# Patient Record
Sex: MALE | Race: WHITE | NOT HISPANIC OR LATINO | Employment: UNEMPLOYED | URBAN - METROPOLITAN AREA
[De-identification: names, ages, dates, MRNs, and addresses within clinical notes are randomized per-mention and may not be internally consistent; named-entity substitution may affect disease eponyms.]

---

## 2019-09-30 ENCOUNTER — HISTORICAL (OUTPATIENT)
Dept: ADMINISTRATIVE | Facility: HOSPITAL | Age: 49
End: 2019-09-30

## 2019-09-30 LAB
ALBUMIN SERPL-MCNC: 3.8 GM/DL (ref 3.4–5)
ALBUMIN/GLOB SERPL: 1.4 {RATIO}
ALP SERPL-CCNC: 62 UNIT/L (ref 50–136)
ALT SERPL-CCNC: 21 UNIT/L (ref 12–78)
AST SERPL-CCNC: 9 UNIT/L (ref 15–37)
BILIRUB SERPL-MCNC: 0.3 MG/DL (ref 0.2–1)
BILIRUBIN DIRECT+TOT PNL SERPL-MCNC: 0.1 MG/DL (ref 0–0.2)
BILIRUBIN DIRECT+TOT PNL SERPL-MCNC: 0.2 MG/DL (ref 0–0.8)
BUN SERPL-MCNC: 23 MG/DL (ref 7–18)
CALCIUM SERPL-MCNC: 9.3 MG/DL (ref 8.5–10.1)
CHLORIDE SERPL-SCNC: 107 MMOL/L (ref 98–107)
CHOLEST SERPL-MCNC: 275 MG/DL (ref 0–200)
CHOLEST/HDLC SERPL: 8.1 {RATIO} (ref 0–5)
CO2 SERPL-SCNC: 28 MMOL/L (ref 21–32)
CREAT SERPL-MCNC: 0.92 MG/DL (ref 0.7–1.3)
GLOBULIN SER-MCNC: 2.8 GM/DL (ref 2.4–3.5)
GLUCOSE SERPL-MCNC: 104 MG/DL (ref 74–106)
HAV IGM SERPL QL IA: NEGATIVE
HBV CORE IGM SERPL QL IA: NEGATIVE
HBV SURFACE AG SERPL QL IA: NEGATIVE
HCV AB SERPL QL IA: NEGATIVE
HDLC SERPL-MCNC: 34 MG/DL (ref 35–60)
HEPATITIS PANEL INTERP: NORMAL
HIV 1+2 AB+HIV1 P24 AG SERPL QL IA: NEGATIVE
LDLC SERPL CALC-MCNC: 203 MG/DL (ref 0–129)
POTASSIUM SERPL-SCNC: 3.9 MMOL/L (ref 3.5–5.1)
PROT SERPL-MCNC: 6.6 GM/DL (ref 6.4–8.2)
SODIUM SERPL-SCNC: 139 MMOL/L (ref 136–145)
TRIGL SERPL-MCNC: 191 MG/DL (ref 30–150)
VLDLC SERPL CALC-MCNC: 38 MG/DL

## 2021-12-14 ENCOUNTER — HISTORICAL (OUTPATIENT)
Dept: ADMINISTRATIVE | Facility: HOSPITAL | Age: 51
End: 2021-12-14

## 2022-02-25 ENCOUNTER — HISTORICAL (OUTPATIENT)
Dept: ADMINISTRATIVE | Facility: HOSPITAL | Age: 52
End: 2022-02-25

## 2022-04-12 ENCOUNTER — HISTORICAL (OUTPATIENT)
Dept: ADMINISTRATIVE | Facility: HOSPITAL | Age: 52
End: 2022-04-12

## 2022-04-30 VITALS
HEIGHT: 67 IN | BODY MASS INDEX: 24.53 KG/M2 | WEIGHT: 156.31 LBS | DIASTOLIC BLOOD PRESSURE: 80 MMHG | SYSTOLIC BLOOD PRESSURE: 130 MMHG | OXYGEN SATURATION: 98 %

## 2023-03-16 DIAGNOSIS — R16.0 HYPODENSE MASS OF RIGHT LOBE OF LIVER: Primary | ICD-10-CM

## 2023-05-11 ENCOUNTER — HOSPITAL ENCOUNTER (INPATIENT)
Facility: HOSPITAL | Age: 53
LOS: 7 days | Discharge: HOME | DRG: 885 | End: 2023-05-18
Attending: PSYCHIATRY & NEUROLOGY | Admitting: PSYCHIATRY & NEUROLOGY
Payer: MEDICAID

## 2023-05-11 DIAGNOSIS — F29 UNSPECIFIED PSYCHOSIS NOT DUE TO A SUBSTANCE OR KNOWN PHYSIOLOGICAL CONDITION: ICD-10-CM

## 2023-05-11 LAB
ALBUMIN SERPL-MCNC: 3.9 G/DL (ref 3.5–5)
ALBUMIN/GLOB SERPL: 1.3 RATIO (ref 1.1–2)
ALP SERPL-CCNC: 66 UNIT/L (ref 40–150)
ALT SERPL-CCNC: 17 UNIT/L (ref 0–55)
AST SERPL-CCNC: 16 UNIT/L (ref 5–34)
BASOPHILS # BLD AUTO: 0.05 X10(3)/MCL
BASOPHILS NFR BLD AUTO: 0.7 %
BILIRUBIN DIRECT+TOT PNL SERPL-MCNC: 0.3 MG/DL
BUN SERPL-MCNC: 10.1 MG/DL (ref 8.4–25.7)
CALCIUM SERPL-MCNC: 9.7 MG/DL (ref 8.4–10.2)
CHLORIDE SERPL-SCNC: 105 MMOL/L (ref 98–107)
CHOLEST SERPL-MCNC: 307 MG/DL
CHOLEST/HDLC SERPL: 7 {RATIO} (ref 0–5)
CO2 SERPL-SCNC: 27 MMOL/L (ref 22–29)
CREAT SERPL-MCNC: 0.85 MG/DL (ref 0.73–1.18)
EOSINOPHIL # BLD AUTO: 0.09 X10(3)/MCL (ref 0–0.9)
EOSINOPHIL NFR BLD AUTO: 1.3 %
ERYTHROCYTE [DISTWIDTH] IN BLOOD BY AUTOMATED COUNT: 15.7 % (ref 11.5–17)
EST. AVERAGE GLUCOSE BLD GHB EST-MCNC: 111.2 MG/DL
GFR SERPLBLD CREATININE-BSD FMLA CKD-EPI: >60 MLS/MIN/1.73/M2
GLOBULIN SER-MCNC: 3.1 GM/DL (ref 2.4–3.5)
GLUCOSE SERPL-MCNC: 86 MG/DL (ref 74–100)
HBA1C MFR BLD: 5.5 %
HCT VFR BLD AUTO: 50.2 % (ref 42–52)
HDLC SERPL-MCNC: 45 MG/DL (ref 35–60)
HGB BLD-MCNC: 16.6 G/DL (ref 14–18)
IMM GRANULOCYTES # BLD AUTO: 0.02 X10(3)/MCL (ref 0–0.04)
IMM GRANULOCYTES NFR BLD AUTO: 0.3 %
LDLC SERPL CALC-MCNC: 229 MG/DL (ref 50–140)
LYMPHOCYTES # BLD AUTO: 1.84 X10(3)/MCL (ref 0.6–4.6)
LYMPHOCYTES NFR BLD AUTO: 25.8 %
MCH RBC QN AUTO: 30.5 PG (ref 27–31)
MCHC RBC AUTO-ENTMCNC: 33.1 G/DL (ref 33–36)
MCV RBC AUTO: 92.1 FL (ref 80–94)
MONOCYTES # BLD AUTO: 1 X10(3)/MCL (ref 0.1–1.3)
MONOCYTES NFR BLD AUTO: 14 %
NEUTROPHILS # BLD AUTO: 4.14 X10(3)/MCL (ref 2.1–9.2)
NEUTROPHILS NFR BLD AUTO: 57.9 %
NRBC BLD AUTO-RTO: 0 %
PLATELET # BLD AUTO: 309 X10(3)/MCL (ref 130–400)
PMV BLD AUTO: 9.4 FL (ref 7.4–10.4)
POTASSIUM SERPL-SCNC: 5.1 MMOL/L (ref 3.5–5.1)
PROT SERPL-MCNC: 7 GM/DL (ref 6.4–8.3)
RBC # BLD AUTO: 5.45 X10(6)/MCL (ref 4.7–6.1)
SODIUM SERPL-SCNC: 142 MMOL/L (ref 136–145)
T PALLIDUM AB SER QL: NONREACTIVE
TRIGL SERPL-MCNC: 166 MG/DL (ref 34–140)
TSH SERPL-ACNC: 0.86 UIU/ML (ref 0.35–4.94)
VLDLC SERPL CALC-MCNC: 33 MG/DL
WBC # SPEC AUTO: 7.14 X10(3)/MCL (ref 4.5–11.5)

## 2023-05-11 PROCEDURE — 84443 ASSAY THYROID STIM HORMONE: CPT | Performed by: PSYCHIATRY & NEUROLOGY

## 2023-05-11 PROCEDURE — 80053 COMPREHEN METABOLIC PANEL: CPT | Performed by: PSYCHIATRY & NEUROLOGY

## 2023-05-11 PROCEDURE — 25000003 PHARM REV CODE 250

## 2023-05-11 PROCEDURE — 12400001 HC PSYCH SEMI-PRIVATE ROOM

## 2023-05-11 PROCEDURE — 80061 LIPID PANEL: CPT | Performed by: PSYCHIATRY & NEUROLOGY

## 2023-05-11 PROCEDURE — 86780 TREPONEMA PALLIDUM: CPT | Performed by: PSYCHIATRY & NEUROLOGY

## 2023-05-11 PROCEDURE — 85025 COMPLETE CBC W/AUTO DIFF WBC: CPT | Performed by: PSYCHIATRY & NEUROLOGY

## 2023-05-11 PROCEDURE — 83036 HEMOGLOBIN GLYCOSYLATED A1C: CPT | Performed by: PSYCHIATRY & NEUROLOGY

## 2023-05-11 RX ORDER — LORAZEPAM 1 MG/1
2 TABLET ORAL EVERY 4 HOURS PRN
Status: DISCONTINUED | OUTPATIENT
Start: 2023-05-11 | End: 2023-05-18 | Stop reason: HOSPADM

## 2023-05-11 RX ORDER — DIPHENHYDRAMINE HYDROCHLORIDE 50 MG/ML
50 INJECTION INTRAMUSCULAR; INTRAVENOUS EVERY 4 HOURS PRN
Status: DISCONTINUED | OUTPATIENT
Start: 2023-05-11 | End: 2023-05-18 | Stop reason: HOSPADM

## 2023-05-11 RX ORDER — QUETIAPINE FUMARATE 100 MG/1
100 TABLET, FILM COATED ORAL NIGHTLY
Status: DISCONTINUED | OUTPATIENT
Start: 2023-05-11 | End: 2023-05-15

## 2023-05-11 RX ORDER — ARIPIPRAZOLE 5 MG/1
30 TABLET ORAL DAILY
Status: DISCONTINUED | OUTPATIENT
Start: 2023-05-11 | End: 2023-05-18 | Stop reason: HOSPADM

## 2023-05-11 RX ORDER — MAG HYDROX/ALUMINUM HYD/SIMETH 200-200-20
30 SUSPENSION, ORAL (FINAL DOSE FORM) ORAL EVERY 6 HOURS PRN
Status: DISCONTINUED | OUTPATIENT
Start: 2023-05-11 | End: 2023-05-18 | Stop reason: HOSPADM

## 2023-05-11 RX ORDER — LORAZEPAM 2 MG/ML
2 INJECTION INTRAMUSCULAR EVERY 4 HOURS PRN
Status: DISCONTINUED | OUTPATIENT
Start: 2023-05-11 | End: 2023-05-18 | Stop reason: HOSPADM

## 2023-05-11 RX ORDER — HALOPERIDOL 5 MG/ML
10 INJECTION INTRAMUSCULAR EVERY 4 HOURS PRN
Status: DISCONTINUED | OUTPATIENT
Start: 2023-05-11 | End: 2023-05-18 | Stop reason: HOSPADM

## 2023-05-11 RX ORDER — ONDANSETRON 4 MG/1
4 TABLET, ORALLY DISINTEGRATING ORAL EVERY 8 HOURS PRN
Status: DISCONTINUED | OUTPATIENT
Start: 2023-05-11 | End: 2023-05-18 | Stop reason: HOSPADM

## 2023-05-11 RX ORDER — HYDROXYZINE HYDROCHLORIDE 50 MG/1
50 TABLET, FILM COATED ORAL EVERY 4 HOURS PRN
Status: DISCONTINUED | OUTPATIENT
Start: 2023-05-11 | End: 2023-05-18 | Stop reason: HOSPADM

## 2023-05-11 RX ORDER — IBUPROFEN 200 MG
1 TABLET ORAL DAILY
Status: DISCONTINUED | OUTPATIENT
Start: 2023-05-11 | End: 2023-05-18 | Stop reason: HOSPADM

## 2023-05-11 RX ORDER — PROMETHAZINE HYDROCHLORIDE 25 MG/1
25 TABLET ORAL EVERY 6 HOURS PRN
Status: DISCONTINUED | OUTPATIENT
Start: 2023-05-11 | End: 2023-05-18 | Stop reason: HOSPADM

## 2023-05-11 RX ORDER — ACETAMINOPHEN 325 MG/1
650 TABLET ORAL EVERY 6 HOURS PRN
Status: DISCONTINUED | OUTPATIENT
Start: 2023-05-11 | End: 2023-05-18 | Stop reason: HOSPADM

## 2023-05-11 RX ORDER — HALOPERIDOL 5 MG/1
10 TABLET ORAL EVERY 4 HOURS PRN
Status: DISCONTINUED | OUTPATIENT
Start: 2023-05-11 | End: 2023-05-18 | Stop reason: HOSPADM

## 2023-05-11 RX ORDER — DIPHENHYDRAMINE HCL 50 MG
50 CAPSULE ORAL EVERY 4 HOURS PRN
Status: DISCONTINUED | OUTPATIENT
Start: 2023-05-11 | End: 2023-05-18 | Stop reason: HOSPADM

## 2023-05-11 RX ADMIN — ARIPIPRAZOLE 30 MG: 5 TABLET ORAL at 10:05

## 2023-05-11 RX ADMIN — QUETIAPINE FUMARATE 100 MG: 100 TABLET ORAL at 08:05

## 2023-05-11 NOTE — H&P
[facility] - Behavioral Health Unit  History & Physical    Subjective:      Chief Complaint/Reason for Admission: history of schizophrenia in past with refusal to take his meds     Gerardo Lopez is a 52 y.o. male. Refuses to take his meds - delusional disorder     Past Medical History:   Diagnosis Date    Addiction to drug     Bipolar disorder     History of psychiatric hospitalization     Psychiatric exam requested by authority     Schizoaffective disorder     Substance abuse      No past surgical history on file.  History reviewed. No pertinent family history.  Social History     Tobacco Use    Smoking status: Every Day     Packs/day: 1.00     Types: Cigarettes     Passive exposure: Current    Smokeless tobacco: Never   Substance Use Topics    Alcohol use: Not Currently    Drug use: Yes     Types: Amphetamines       PTA Medications   Medication Sig    ARIPiprazole (ABILIFY) 30 MG Tab Take 30 mg by mouth.    QUEtiapine (SEROQUEL) 300 MG Tab Take 300 mg by mouth every evening.     Review of patient's allergies indicates:  No Known Allergies     Review of Systems   Constitutional: Negative.    HENT: Negative.     Eyes: Negative.    Respiratory: Negative.     Cardiovascular: Negative.    Gastrointestinal: Negative.    Genitourinary: Negative.    Musculoskeletal: Negative.    Skin: Negative.    Neurological: Negative.    Endo/Heme/Allergies: Negative.    Psychiatric/Behavioral:  Positive for depression and suicidal ideas. Negative for hallucinations and substance abuse.      Objective:      Vital Signs (Most Recent)  Temp: 97.9 °F (36.6 °C) (05/11/23 0031)  Pulse: 82 (05/11/23 0031)  Resp: 18 (05/11/23 0031)  BP: 92/63 (05/11/23 0031)  SpO2: 96 % (05/11/23 0031)    Vital Signs Range (Last 24H):  Temp:  [97.7 °F (36.5 °C)-98.2 °F (36.8 °C)]   Pulse:  []   Resp:  [18-20]   BP: ()/()   SpO2:  [96 %-100 %]     Physical Exam  HENT:      Head: Normocephalic.      Right Ear: Tympanic  membrane normal.      Left Ear: Tympanic membrane normal.      Nose: Nose normal.      Mouth/Throat:      Mouth: Mucous membranes are moist.   Eyes:      Extraocular Movements: Extraocular movements intact.      Pupils: Pupils are equal, round, and reactive to light.   Cardiovascular:      Rate and Rhythm: Normal rate and regular rhythm.   Pulmonary:      Effort: Pulmonary effort is normal.   Abdominal:      General: Abdomen is flat.   Musculoskeletal:         General: Normal range of motion.   Skin:     General: Skin is warm.   Neurological:      General: No focal deficit present.      Mental Status: He is alert and oriented to person, place, and time.      Comments: Vision normal   Hearing normal   EOM intact   Face muscles normal  Facial sensation normal   Shrugs shoulders  Tongue midline          Data Review:    Recent Results (from the past 48 hour(s))   Ethanol    Collection Time: 05/10/23  2:42 PM   Result Value Ref Range    Ethanol Level <10.0 <=10.0 mg/dL   Comprehensive Metabolic Panel    Collection Time: 05/10/23  2:42 PM   Result Value Ref Range    Sodium Level 140 136 - 145 mmol/L    Potassium Level 4.3 3.5 - 5.1 mmol/L    Chloride 103 98 - 107 mmol/L    Carbon Dioxide 26 22 - 29 mmol/L    Glucose Level 110 (H) 74 - 100 mg/dL    Blood Urea Nitrogen 9.5 8.4 - 25.7 mg/dL    Creatinine 0.87 0.73 - 1.18 mg/dL    Calcium Level Total 9.5 8.4 - 10.2 mg/dL    Protein Total 7.4 6.4 - 8.3 gm/dL    Albumin Level 4.0 3.5 - 5.0 g/dL    Globulin 3.4 2.4 - 3.5 gm/dL    Albumin/Globulin Ratio 1.2 1.1 - 2.0 ratio    Bilirubin Total 0.3 <=1.5 mg/dL    Alkaline Phosphatase 75 40 - 150 unit/L    Alanine Aminotransferase 17 0 - 55 unit/L    Aspartate Aminotransferase 16 5 - 34 unit/L    eGFR >60 mls/min/1.73/m2   Acetaminophen Level    Collection Time: 05/10/23  2:42 PM   Result Value Ref Range    Acetaminophen Level <17.4 (L) 17.4 - 30.0 ug/ml   Salicylate Level    Collection Time: 05/10/23  2:42 PM   Result Value Ref  Range    Salicylate Level <5.0 mg/dL   TSH    Collection Time: 05/10/23  2:42 PM   Result Value Ref Range    Thyroid Stimulating Hormone 1.348 0.350 - 4.940 uIU/mL   CBC with Differential    Collection Time: 05/10/23  2:42 PM   Result Value Ref Range    WBC 6.47 4.50 - 11.50 x10(3)/mcL    RBC 5.54 4.70 - 6.10 x10(6)/mcL    Hgb 17.0 14.0 - 18.0 g/dL    Hct 51.2 42.0 - 52.0 %    MCV 92.4 80.0 - 94.0 fL    MCH 30.7 27.0 - 31.0 pg    MCHC 33.2 33.0 - 36.0 g/dL    RDW 15.8 11.5 - 17.0 %    Platelet 304 130 - 400 x10(3)/mcL    MPV 8.4 7.4 - 10.4 fL    Neut % 51.1 %    Lymph % 30.4 %    Mono % 16.8 %    Eos % 0.8 %    Basophil % 0.6 %    Lymph # 1.97 0.6 - 4.6 x10(3)/mcL    Neut # 3.30 2.1 - 9.2 x10(3)/mcL    Mono # 1.09 0.1 - 1.3 x10(3)/mcL    Eos # 0.05 0 - 0.9 x10(3)/mcL    Baso # 0.04 <=0.2 x10(3)/mcL    IG# 0.02 0 - 0.04 x10(3)/mcL    IG% 0.3 %    NRBC% 0.0 %   Drug Screen, Urine    Collection Time: 05/10/23  5:35 PM   Result Value Ref Range    Amphetamines, Urine Positive (A) Negative    Barbituates, Urine Negative Negative    Benzodiazepine, Urine Negative Negative    Cannabinoids, Urine Positive (A) Negative    Cocaine, Urine Negative Negative    Fentanyl, Urine Negative Negative    MDMA, Urine Negative Negative    Opiates, Urine Negative Negative    Phencyclidine, Urine Negative Negative    pH, Urine 6.5 3.0 - 11.0   Urinalysis, Reflex to Urine Culture    Collection Time: 05/10/23  5:35 PM    Specimen: Urine, Clean Catch   Result Value Ref Range    Color, UA Yellow Yellow, Light-Yellow, Dark Yellow, Sima, Straw    Appearance, UA Clear Clear    Specific Gravity, UA 1.018 1.005 - 1.030    pH, UA 6.5 5.0 - 8.5    Protein, UA Trace (A) Negative mg/dL    Glucose, UA Negative Negative, Normal mg/dL    Ketones, UA Negative Negative mg/dL    Blood, UA Negative Negative unit/L    Bilirubin, UA Negative Negative mg/dL    Urobilinogen, UA 0.2 0.2, 1.0, Normal mg/dL    Nitrites, UA Negative Negative    Leukocyte Esterase, UA  Negative Negative unit/L   Urinalysis, Microscopic    Collection Time: 05/10/23  5:35 PM   Result Value Ref Range    RBC, UA 6 (H) <=5 /HPF    WBC, UA <5 <=5 /HPF    Squamous Epithelial Cells, UA <5 <=5 /HPF    Bacteria, UA None Seen None Seen, Rare, Occasional /HPF   COVID-19 Rapid Screening    Collection Time: 05/10/23  8:13 PM   Result Value Ref Range    SARS COV-2 MOLECULAR Negative Negative        No results found.       Assessment and Plan       Delusional disorder

## 2023-05-11 NOTE — PLAN OF CARE
Problem: Adult Inpatient Plan of Care  Goal: Plan of Care Review  Outcome: Ongoing, Progressing  Goal: Patient-Specific Goal (Individualized)  Outcome: Ongoing, Progressing  Goal: Absence of Hospital-Acquired Illness or Injury  Outcome: Ongoing, Progressing  Goal: Optimal Comfort and Wellbeing  Outcome: Ongoing, Progressing  Goal: Readiness for Transition of Care  Outcome: Ongoing, Progressing     Problem: Violence Risk or Actual  Goal: Anger and Impulse Control  Outcome: Ongoing, Progressing     Problem: Behavior Regulation Impairment (Excessive Substance Use)  Goal: Improved Behavioral Control (Excessive Substance Use)  Outcome: Ongoing, Progressing     Problem: Decreased Participation and Engagement (Excessive Substance Use)  Goal: Increased Participation and Engagement (Excessive Substance Use)  Outcome: Ongoing, Progressing     Problem: Decreased Participation and Engagement (Psychotic Signs/Symptoms)  Goal: Increased Participation and Engagement (Psychotic Signs/Symptoms)  Outcome: Ongoing, Progressing     Problem: Mood Impairment (Psychotic Signs/Symptoms)  Goal: Improved Mood Symptoms (Psychotic Signs/Symptoms)  Outcome: Ongoing, Progressing

## 2023-05-11 NOTE — NURSING
"Daily Nursing Note:      Behavior:    Patient (Gerardo Lopez is a 52 y.o. male, : 1970, MRN: [id number]) demonstrating an affect that was  labile. Gerardo demonstrating mood that is anxious. Gerardo had an appearance that was disheveled and poor hygiene. Gerardo denies suicidal ideation. Gerardo denies suicide plan. Gerardo denies homicidal ideation. Gerardo denies hallucinations.    Gerardo's  height is 5' 6" (1.676 m) and weight is 70.3 kg (155 lb). His temperature is 97.9 °F (36.6 °C). His blood pressure is 92/63 and his pulse is 82. His respiration is 18 and oxygen saturation is 96%.     Gerardo's last BM was noted on: 05/10/2023_______      Intervention:    Encourage Gerardo to perform self-hygiene, grooming, and changing of clothing. Monitor Gerardo's behavior and program compliance. Monitor Gerardo for suicidal ideation, homicidal ideation, sleep disturbance, and hallucinations. Encourage Gerardo to eat all portions of meals and assess for meal preferences. Monitor Gerardo for intake and output to ensure hydration. Notify the Physician/Physician Assistant/Advance Practice Registered Nurse (MD/PA/APRN) for any medication refusal and any change in patient condition.      Response:    Gerardo verbalizes understand of unit process and procedures. Gerardo reported medications __ _ Medications  started today.  Compliant with medications.  Educated on new medications.___.      Plan:     Continue to monitor per MD/PA/APRN orders; maintain patient safety.    "

## 2023-05-11 NOTE — PLAN OF CARE
Psychosocial Assessment     Pt is a 52 y.o. YO  male admitted due to psychosis. Pt UDS was Positive for THC and methamphetamines.  Pt ETOH < 10. Pt denies  SI, HI, and AVH at this time. Pt last inpatient  was 2009 but denied [facility] inpatient . Pt presents Cooperative with CM staff 1:1. Pt originally from [place]  . Pt has  no dependents. Pt  is Single .  Pt completed Some college. Pt no  service.  Pt denies financial issues. Pt disabled.  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 Pt denies legal issues. Pt states they do not receive comfort from spiritual practices. Pt emergency contact is Father Maribell Lopez. Their phone number is  [phone] . Pt discharge plan at this time is home; which is known at this time. Pt reports living with father and is unable to recall address.       05/11/23 1210   Initial Information   Source of Information patient   Reason for Admission psychosis   Patient Aware of Diagnosis yes   Arrived From emergency department   Current or Previous  Service none   Spiritual Beliefs   Spiritual, Cultural Beliefs, Holiness Practices, Values that Affect Care yes   Description of Beliefs that Will Affect Care Rastafari   Substance Use/Withdrawal   Substance Use Current, used prior to admission   Additional Tobacco Use   How many cigarettes do you typically have per day? 20   Abuse Screen (yes response referral indicated)   Feels Unsafe at Home or Work/School no   Feels Threatened by Someone no   Does anyone try to keep you from having contact with others or doing things outside your home? no   Physical Signs of Abuse Present no   Abuse Details   Physical Abuse No   Sexual Abuse No   Emotional Abuse No   AUDIT-C (Alcohol Use Disorders ID Test)   Alcohol Use In Past Year 0-->never   Alcohol Amount Per Day In Past Year 0-->none   More Than 6 Drinks On One Occasion In Past Year 0-->never   Total Audit C Score 0

## 2023-05-11 NOTE — GROUP NOTE
Group Psychotherapy       Group Focus: Life Skills   Leisure Exploration: Patient will be provided with several leisure activities to choose from. Patient will be able to attend the group session with an appropriate and positive outlook on life and the group session. Patient will be able to give and receive positive feedback to and from peers and therapist.    Number of patients in attendance: 5    Group Start Time: 1400  Group End Time:  1430  Groups Date: 5/11/2023  Group Topic:  Behavioral Health  [facility]  Group Facilitators:  Monae Wang  _____________________________________________________________________    Patient Name: Gerardo Lopez  MRN: [id number]  Patient Class: IP- Psych   Admission Date\Time: 5/11/2023 12:31 AM  Hospital Length of Stay: 0  Primary Care Provider: Primary Doctor No     Referred by:      Target symptoms: Psychosis     Patient's response to treatment: Not Participating; Pt did not attend group session. Therapist will provide alternate activity.     Progress toward goals: Not progressing     Interval History:      Diagnosis:      Plan: Therapist will continue to encourage patient to attend daily group sessions. Patient will engage adequately and effectively in group setting.

## 2023-05-11 NOTE — CARE UPDATE
SW spoke to nursing staff at [facility] who relayed pt is not an active pt as of 5.3.2023. Pt was last seen on 1.4.2022 and last in contact with any member of staff on 5.4.2022.

## 2023-05-11 NOTE — PROGRESS NOTES
05/11/23 0149   Pain/Comfort/Sleep   Preferred Pain Scale number (Numeric Rating Pain Scale)   Comfort/Acceptable Pain Level 0   Pain Rating (0-10): Rest 0   Coping/Psychosocial   Verbalized Emotional State anger;frustration   Behavioral   General Appearance [WDL Definition: Well-kept, clean; dress appropriate for weather/appropriate for setting] WDL except   General Appearance unkempt;unclean;body odor   Behavior WDL   Behavior [WDL Definition: Appropriate to situation, cooperative, appropriate eye contact; erect posture, head raised, steady gait; no unusual gestures/mannerisms] WDL except   Behavior Interactions argumentative;demanding;eye contact intense;guarded;hostile;mistrustful;suspicious   Motor Movement agitated;eye contact inappropriate   Emotion Mood WDL   Emotion/Mood/Affect [WDL Definition: Calm; euthymic; affect consistent with mood; facial expression relaxed, appropriate to situation] WDL except   Affect animated;labile   Emotion/Mood/Affect Symptoms angry;anxious;elevated;irritable   Speech WDL   Speech [WDL Definition: Moderate rate and volume; clear, coherent; articulate; effective] WDL except   Speech Symptoms hyper verbal;pressured speech   Perceptual State WDL   Perceptual State [WDL Definition: Consistent with reality; denies hallucinations] WDL except   Hallucinations denies hallucinations   Perceptual State derealization   Thought Process WDL   Thought Process [WDL Definition: Judgment and insight appropriate to situation; logical, relevant, and linear thought process] WDL except   Delusions grandeur;paranoid;persecutory;somatic   Judgment and Insight blaming others;insight not appropriate to situation   Thought Content denial;preoccupation;suspiciousness   Thought Process Symptoms disorganized;flight of ideas;illogical;tangential   Intellectual Performance WDL   Intellectual Performance [WDL Definition: Alert, oriented x 4; immediate, recent and remote memory intact; able to comprehend]  WDL except   Intellectual Performance Symptoms impaired concentration   Level of Consciousness (AVPU) alert   AIMS   Face/Oral Movement: Face Muscle Express 0-->none   Face/Oral Movement: Lips/Perioral 0-->none   Face/Oral Movement: Jaw 0-->none   Face/Oral Movement: Tongue 0-->none   Extremity Movement: Upper 0-->none   Extremity Movement: Lower 0-->none   Trunk Movement: Neck/Shoulder/Hips 0-->none   Global Judgment: Severity 0-->none, normal   Global Judgment: Incapacitated 0-->none, normal   Global Judgment: Awareness 0-->no awareness   Dental: Current Problems no   Dental: Dentures Worn no   Additional Info: [place] no   Additional Info: Disappear with Sleep no   Cognitive   Cognitive/Neuro/Behavioral WDL ex   Orientation oriented x 4   Speech clear/fluent   Safety   Patient Location conference room   Observed Behavior angry/hostile;sitting   [place] Psychiatric Fall Risk Tool   Age 10-->50 - 79   Mental Status -4-->Fully alert/oriented at all times   Elimination 8-->Independent with control of bowel/bladder   Medications 10-->No medications   Diagnosis 10-->Bipolar/schizoaffective disorder   Ambulation/Balance 7-->Independent/steady gait/immobile   Nutrition 0-->No apparent abnormalities with appetite   Sleep Disturbance 8-->No sleep disturbance   History of Falls 8-->No history of falls   Score ([place] Fall Risk) 57   ABC Risk for Fall with Injury Assessment   A= Age: Is the patient greater than or equal to 85 years old or frail due to clinical condition? No   B=Bones: Does the patient have osteoporosis, previous fracture, prolonged steroid use, or metastatic bone cancer? No   C=Coagulation Disorders: Does the patient have a bleeding disorder, either through anticoagulants or underlying clinical condition? No   S=recent Surgery: Is the patient post-op surgicalwith a recent lower limb amputation or recent major abdominal or thoracic surgery? No   Babb Suicide Severity Rating Scale   1. Wish to be Dead:  Have you wished you were dead or wished you could go to sleep and not wake up? No   2. Suicidal Thoughts: Have you actually had any thoughts of killing yourself? No   6. Suicide Behavior Question: Have you ever done anything, started to do anything, or prepared to do anything to end your life? No   Suicide Risk No Risk   Violence Risk Screening-10   Previous and/or current violence No   Previous and/or current threats (verbal/physical) No   Previous and/or current substance abuse Yes   Previous and/or current major mental illness Yes   Personality disorder No   Shows lack of insight into illness and/or behavior Yes   Expresses suspicion Yes   Shows lack of empathy Yes   Unrealistic planning Yes   Future stress-situations Yes   Violence Risk   Feels Like Hurting Others no   Safety Management    Patient Rounds visualized patient   Safety Promotion/Fall Prevention nonskid shoes/socks when out of bed   Daily Care   Activity (Behavioral Health) up ad eric   Positioning   Body Position position changed independently   Nutrition   Diet/Nutrition Received regular   Gastrointestinal   GI WDL WDL   Genitourinary   Genitourinary WDL WDL   Skin   Skin WDL WDL   Juan José Risk Assessment   Sensory Perception 4-->no impairment   Moisture 4-->rarely moist   Activity 4-->walks frequently   Mobility 4-->no limitation   Nutrition 2-->probably inadequate   Friction and Shear 3-->no apparent problem   Juan José Score 21   RN Clinical Review   I have evaluated the data collected on this patient and nursing care provided. Done

## 2023-05-11 NOTE — PLAN OF CARE
Problem: Adult Inpatient Plan of Care  Goal: Plan of Care Review  Outcome: Ongoing, Progressing  Goal: Patient-Specific Goal (Individualized)  Outcome: Ongoing, Progressing  Goal: Absence of Hospital-Acquired Illness or Injury  Outcome: Ongoing, Progressing  Goal: Optimal Comfort and Wellbeing  Outcome: Ongoing, Progressing  Goal: Readiness for Transition of Care  Outcome: Ongoing, Progressing     Problem: Violence Risk or Actual  Goal: Anger and Impulse Control  Outcome: Ongoing, Progressing     Problem: Behavior Regulation Impairment (Excessive Substance Use)  Goal: Improved Behavioral Control (Excessive Substance Use)  Outcome: Ongoing, Progressing     Problem: Decreased Participation and Engagement (Excessive Substance Use)  Goal: Increased Participation and Engagement (Excessive Substance Use)  Outcome: Ongoing, Progressing     Problem: Decreased Participation and Engagement (Psychotic Signs/Symptoms)  Goal: Increased Participation and Engagement (Psychotic Signs/Symptoms)  Outcome: Ongoing, Progressing     Problem: Mood Impairment (Psychotic Signs/Symptoms)  Goal: Improved Mood Symptoms (Psychotic Signs/Symptoms)  Outcome: Ongoing, Progressing     Patient admitted to unit.  Plan of care discussed and patient participation encouraged.

## 2023-05-11 NOTE — H&P
5/11/2023  Gerardo Lopez   1970   [id number]            Psychiatry Inpatient Admission Note    Date of Admission: 5/11/2023 12:31 AM    Current Legal Status: Physician's Emergency Certificate    Chief Complaint: I've been cleared of schizophrenia    SUBJECTIVE:   History of Present Illness:   Gerardo Lopez is a 52 y.o. male placed under a PEC at [facility] by OPC from mother after apparently being non compliant with medications and having an exacerbation of symptoms. Patient states that he has bee told by his provider at [facility] that he can stop taking his medications and start using medical marijuana for his symptoms because he does not have Bipolar disorder or Schizophrenia. Patient states that he has been cleared of schizophrenia by the government. He is focused on getting off of his medications because he does not believe he needs them. I discussed with him further about his illness and he is noncompliant with this. I discussed with him about possibly changing his medications and he is not amenable to this.  He would like us to contact Dr Orellana to discuss this. We will need collateral information from both provider and mother. I will decrease the Seroquel to 100 mg at this time and continue Abilify at 30 mg.         Past Psychiatric History:   Previous Psychiatric Hospitalizations: States twice   Previous Medication Trials: Multiple. Most recent Abilify and Seroquel  Previous Suicide Attempts: Denies   Outpatient psychiatrist: Dr Burton with [facility]    Past Medical/Surgical History:   Past Medical History:   Diagnosis Date    Addiction to drug     Bipolar disorder     History of psychiatric hospitalization     Psychiatric exam requested by authority     Schizoaffective disorder     Substance abuse      No past surgical history on file.      Family Psychiatric History:   Mother - Schizophrenia and Bipolar     Allergies:   Review of patient's allergies indicates:  No Known  Allergies    Substance Abuse History:   Tobacco: 1 PPD  Alcohol: Denies  Illicit Substances: Methamphetamine, THC  Treatment: Denies      Current Medications:   Home Psychiatric Meds: Abilify 30 mg, Seroquel 300 mg    Scheduled Meds:    ARIPiprazole  30 mg Oral Daily    nicotine  1 patch Transdermal Daily    QUEtiapine  100 mg Oral QHS      PRN Meds: acetaminophen, aluminum-magnesium hydroxide-simethicone, haloperidoL **AND** diphenhydrAMINE **AND** LORazepam **AND** haloperidol lactate **AND** diphenhydrAMINE **AND** lorazepam, hydrOXYzine HCL, ondansetron, promethazine   Psychotherapeutics (From admission, onward)      Start     Stop Route Frequency Ordered    05/11/23 2100  QUEtiapine tablet 100 mg         -- Oral Nightly 05/11/23 0935    05/11/23 1045  ARIPiprazole tablet 30 mg         -- Oral Daily 05/11/23 0935    05/11/23 0100  haloperidoL tablet 10 mg  (Med - Acute  Behavioral Management)        See Hyperspace for full Linked Orders Report.    -- Oral Every 4 hours PRN 05/11/23 0100    05/11/23 0100  LORazepam tablet 2 mg  (Med - Acute  Behavioral Management)        See Hyperspace for full Linked Orders Report.    -- Oral Every 4 hours PRN 05/11/23 0100    05/11/23 0100  haloperidol lactate injection 10 mg  (Med - Acute  Behavioral Management)        See Hyperspace for full Linked Orders Report.    -- IM Every 4 hours PRN 05/11/23 0100    05/11/23 0100  LORazepam injection 2 mg  (Med - Acute  Behavioral Management)        See Hyperspace for full Linked Orders Report.    -- IM Every 4 hours PRN 05/11/23 0100              Social History:  Housing Status: Lives with father  Relationship Status/Sexual Orientation: Single   Children: Denies  Education: College degree   Employment Status/Info: Disabled    history: Denies  History of physical/sexual abuse: Denies   Access to gun: Denies       Legal History:   Past Charges/Incarcerations: Denies   Pending charges: Denies      OBJECTIVE:   Medical Review Of  Systems:  A comprehensive review of systems was negative except for: Gastrointestinal: positive for vomiting    Vitals   Vitals:    05/11/23 0031   BP: 92/63   Pulse: 82   Resp: 18   Temp: 97.9 °F (36.6 °C)        Labs/Imaging/Studies:   Recent Results (from the past 48 hour(s))   Ethanol    Collection Time: 05/10/23  2:42 PM   Result Value Ref Range    Ethanol Level <10.0 <=10.0 mg/dL   Comprehensive Metabolic Panel    Collection Time: 05/10/23  2:42 PM   Result Value Ref Range    Sodium Level 140 136 - 145 mmol/L    Potassium Level 4.3 3.5 - 5.1 mmol/L    Chloride 103 98 - 107 mmol/L    Carbon Dioxide 26 22 - 29 mmol/L    Glucose Level 110 (H) 74 - 100 mg/dL    Blood Urea Nitrogen 9.5 8.4 - 25.7 mg/dL    Creatinine 0.87 0.73 - 1.18 mg/dL    Calcium Level Total 9.5 8.4 - 10.2 mg/dL    Protein Total 7.4 6.4 - 8.3 gm/dL    Albumin Level 4.0 3.5 - 5.0 g/dL    Globulin 3.4 2.4 - 3.5 gm/dL    Albumin/Globulin Ratio 1.2 1.1 - 2.0 ratio    Bilirubin Total 0.3 <=1.5 mg/dL    Alkaline Phosphatase 75 40 - 150 unit/L    Alanine Aminotransferase 17 0 - 55 unit/L    Aspartate Aminotransferase 16 5 - 34 unit/L    eGFR >60 mls/min/1.73/m2   Acetaminophen Level    Collection Time: 05/10/23  2:42 PM   Result Value Ref Range    Acetaminophen Level <17.4 (L) 17.4 - 30.0 ug/ml   Salicylate Level    Collection Time: 05/10/23  2:42 PM   Result Value Ref Range    Salicylate Level <5.0 mg/dL   TSH    Collection Time: 05/10/23  2:42 PM   Result Value Ref Range    Thyroid Stimulating Hormone 1.348 0.350 - 4.940 uIU/mL   CBC with Differential    Collection Time: 05/10/23  2:42 PM   Result Value Ref Range    WBC 6.47 4.50 - 11.50 x10(3)/mcL    RBC 5.54 4.70 - 6.10 x10(6)/mcL    Hgb 17.0 14.0 - 18.0 g/dL    Hct 51.2 42.0 - 52.0 %    MCV 92.4 80.0 - 94.0 fL    MCH 30.7 27.0 - 31.0 pg    MCHC 33.2 33.0 - 36.0 g/dL    RDW 15.8 11.5 - 17.0 %    Platelet 304 130 - 400 x10(3)/mcL    MPV 8.4 7.4 - 10.4 fL    Neut % 51.1 %    Lymph % 30.4 %    Mono %  16.8 %    Eos % 0.8 %    Basophil % 0.6 %    Lymph # 1.97 0.6 - 4.6 x10(3)/mcL    Neut # 3.30 2.1 - 9.2 x10(3)/mcL    Mono # 1.09 0.1 - 1.3 x10(3)/mcL    Eos # 0.05 0 - 0.9 x10(3)/mcL    Baso # 0.04 <=0.2 x10(3)/mcL    IG# 0.02 0 - 0.04 x10(3)/mcL    IG% 0.3 %    NRBC% 0.0 %   Drug Screen, Urine    Collection Time: 05/10/23  5:35 PM   Result Value Ref Range    Amphetamines, Urine Positive (A) Negative    Barbituates, Urine Negative Negative    Benzodiazepine, Urine Negative Negative    Cannabinoids, Urine Positive (A) Negative    Cocaine, Urine Negative Negative    Fentanyl, Urine Negative Negative    MDMA, Urine Negative Negative    Opiates, Urine Negative Negative    Phencyclidine, Urine Negative Negative    pH, Urine 6.5 3.0 - 11.0   Urinalysis, Reflex to Urine Culture    Collection Time: 05/10/23  5:35 PM    Specimen: Urine, Clean Catch   Result Value Ref Range    Color, UA Yellow Yellow, Light-Yellow, Dark Yellow, Sima, Straw    Appearance, UA Clear Clear    Specific Gravity, UA 1.018 1.005 - 1.030    pH, UA 6.5 5.0 - 8.5    Protein, UA Trace (A) Negative mg/dL    Glucose, UA Negative Negative, Normal mg/dL    Ketones, UA Negative Negative mg/dL    Blood, UA Negative Negative unit/L    Bilirubin, UA Negative Negative mg/dL    Urobilinogen, UA 0.2 0.2, 1.0, Normal mg/dL    Nitrites, UA Negative Negative    Leukocyte Esterase, UA Negative Negative unit/L   Urinalysis, Microscopic    Collection Time: 05/10/23  5:35 PM   Result Value Ref Range    RBC, UA 6 (H) <=5 /HPF    WBC, UA <5 <=5 /HPF    Squamous Epithelial Cells, UA <5 <=5 /HPF    Bacteria, UA None Seen None Seen, Rare, Occasional /HPF   COVID-19 Rapid Screening    Collection Time: 05/10/23  8:13 PM   Result Value Ref Range    SARS COV-2 MOLECULAR Negative Negative   Hemoglobin A1C    Collection Time: 05/11/23  7:28 AM   Result Value Ref Range    Hemoglobin A1c 5.5 <=7.0 %    Estimated Average Glucose 111.2 mg/dL   CBC with Differential    Collection Time:  05/11/23  7:28 AM   Result Value Ref Range    WBC 7.14 4.50 - 11.50 x10(3)/mcL    RBC 5.45 4.70 - 6.10 x10(6)/mcL    Hgb 16.6 14.0 - 18.0 g/dL    Hct 50.2 42.0 - 52.0 %    MCV 92.1 80.0 - 94.0 fL    MCH 30.5 27.0 - 31.0 pg    MCHC 33.1 33.0 - 36.0 g/dL    RDW 15.7 11.5 - 17.0 %    Platelet 309 130 - 400 x10(3)/mcL    MPV 9.4 7.4 - 10.4 fL    Neut % 57.9 %    Lymph % 25.8 %    Mono % 14.0 %    Eos % 1.3 %    Basophil % 0.7 %    Lymph # 1.84 0.6 - 4.6 x10(3)/mcL    Neut # 4.14 2.1 - 9.2 x10(3)/mcL    Mono # 1.00 0.1 - 1.3 x10(3)/mcL    Eos # 0.09 0 - 0.9 x10(3)/mcL    Baso # 0.05 <=0.2 x10(3)/mcL    IG# 0.02 0 - 0.04 x10(3)/mcL    IG% 0.3 %    NRBC% 0.0 %   Lipid panel    Collection Time: 05/11/23  7:28 AM   Result Value Ref Range    Cholesterol Total 307 (H) <=200 mg/dL    HDL Cholesterol 45 35 - 60 mg/dL    Triglyceride 166 (H) 34 - 140 mg/dL    Cholesterol/HDL Ratio 7 (H) 0 - 5    Very Low Density Lipoprotein 33     LDL Cholesterol 229.00 (H) 50.00 - 140.00 mg/dL   Comprehensive metabolic panel    Collection Time: 05/11/23  7:28 AM   Result Value Ref Range    Sodium Level 142 136 - 145 mmol/L    Potassium Level 5.1 3.5 - 5.1 mmol/L    Chloride 105 98 - 107 mmol/L    Carbon Dioxide 27 22 - 29 mmol/L    Glucose Level 86 74 - 100 mg/dL    Blood Urea Nitrogen 10.1 8.4 - 25.7 mg/dL    Creatinine 0.85 0.73 - 1.18 mg/dL    Calcium Level Total 9.7 8.4 - 10.2 mg/dL    Protein Total 7.0 6.4 - 8.3 gm/dL    Albumin Level 3.9 3.5 - 5.0 g/dL    Globulin 3.1 2.4 - 3.5 gm/dL    Albumin/Globulin Ratio 1.3 1.1 - 2.0 ratio    Bilirubin Total 0.3 <=1.5 mg/dL    Alkaline Phosphatase 66 40 - 150 unit/L    Alanine Aminotransferase 17 0 - 55 unit/L    Aspartate Aminotransferase 16 5 - 34 unit/L    eGFR >60 mls/min/1.73/m2      No results found for: PHENYTOIN, PHENOBARB, VALPROATE, CBMZ        Psychiatric Mental Status Exam:  General Appearance: well-nourished, normal weight, dressed in hospital garb, appears older than stated age, poorly  groomed, disheveled  Arousal: alert  Behavior: cooperative, appropriate eye-contact, restless and fidgety, Defiant but somewhat redirectable  Movements and Motor Activity: no abnormal involuntary movements noted; no tics, no tremors, no akathisia, no dystonia, no evidence of tardive dyskinesia; no psychomotor agitation or retardation  Orientation: intact; oriented fully to person, place, time and situation  Speech: normal volume, conversational, spontaneous, interruptible, pressured, refuses to speak on topics that go against his beliefs or feelings  Mood: Manic, Irritable, and Dysphoric  Affect: dysphoric, irritable, expansive, bizarre  Thought Process: goal-directed, organized,but illogical  Associations: intact, no loosening of associations  Thought Content and Perceptions: no suicidal or homicidal ideation, no auditory or visual hallucinations, probable paranoid ideation, no ideas of reference, (+) evidence of delusions or psychosis  Recent and Remote Memory: intact; per interview/observation with patient  Attention and Concentration: intact; per interview/observation with patient  Fund of Knowledge: intact; based on history, vocabulary, fund of knowledge, syntax, grammar, and content  Insight: impaired due to illness ; based on understanding of severity of illness and HPI  Judgment: impaired due to illness ; based on patient's behavior and HPI      Patient Strengths:  Access to care and Able to verbalize needs      Patient Liabilities:  Medication non-compliance, Substance use, Detrimental home environment, Psychosis, Svetlana, Chronic psychiatric illness, and Family stressors      Discharge Criteria:  Improved mood, Improved thought process, Medication compliance, Decreased anxiety, Improved social functioning, and Motivation for outpatient counseling      Reason for Admission:  The patient poses a significant and immediate danger to self., The patient poses a significant and immediate danger to others due to a  psychiatric condition., The patient is gravely disabled due to a psychiatric condition., The psychiatric disorder requires intensive treatment that necessitates 24 hour observation and care., The patient presents with psychiatric symptoms of sufficient severity to bring about significant or profound impairment of day to day psychological, social, vocational, and/or educational functioning., To stabilize the decompensation of a mental disorder that severely interferes with patient's functioning., and The patient has failed to make sufficient clinical gains within a traditional outpatient setting and severity of presenting symptoms requires inpatient treatment.    ASSESSMENT/PLAN:   Diagnoses:  SUBSTANCE-RELATED DISORDERS; Amphetamine Related Disorders; Amphetamine Abuse  and SCHIZOPHRENIA AND OTHER PSYCHOTIC DISORDERS; Schizoaffective Disorder  Bipolar type (F25.0)        Past Medical History:   Diagnosis Date    Addiction to drug     Bipolar disorder     History of psychiatric hospitalization     Psychiatric exam requested by [facility]     Schizoaffective disorder     Substance abuse           Problem lists and Management Plans:  -Admit to [facility]    Schizoaffective disorder  -Abilify 30 mg  -Decrease Seroquel to 100 mg    Amphetamine abuse  -Individual and group therapy    -Will attempt to obtain outside psychiatric records if available  - to assist with aftercare planning and collateral  -Continue inpatient treatment as evidenced by significant psychotic thought disorder, danger to self, and danger to others      Estimated length of stay: 5-7    Estimated Disposition: Home    Estimated Follow-up: Outpatient medication management      On this date, I have reviewed the medical history and Nursing Assessment, as well as records from referral source.  I have evaluated the mental status of the above named person and concur with the findings of all assessments.  I have provided medical direction for the  development of the Treatment Plan.    I conclude that this patient meets admission criteria for inpatient treatment.  I certify that this patient poses a danger to self or others, or would otherwise be considered gravely disabled based on this assessment and/or provided collateral information.     I have provided medical direction for the development of the Treatment plan.  These services will be provided while this patient is under my care and will be based on an individualized plan of care.  The patient can demonstrate a reasonable expectation of improvement in his/her disorder as a result of the active treatment being provided.      Azar Huang [facility]

## 2023-05-11 NOTE — PLAN OF CARE
Psychosocial Assessment     Pt is a 52 y.o. YO  male admitted due to psychosis. Pt UDS was Positive for THC and methamphetamines.  Pt ETOH < 10. Pt denies  SI, HI, and AVH at this time. Pt last inpatient  was 2009 but denied [facility] inpatient . Pt presents Cooperative with CM staff 1:1. Pt originally from [place]  . Pt has  no dependents. Pt  is Single .  Pt completed Some college. Pt no  service.  Pt denies financial issues. Pt disabled.  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 Pt denies legal issues. Pt states they do not receive comfort from spiritual practices. Pt emergency contact is Father Maribell Lopez. Their phone number is  [phone] . Pt discharge plan at this time is home; which is known at this time.

## 2023-05-11 NOTE — NURSING
"Admission Note:    Gerardo Lopez is a 52 y.o. male, : 1970, MRN: [id number], admitted on 2023 to [facility] ([facility]) for Tomás Schultz MD with a diagnosis of Unspecified psychosis not due to a substance or known physiological condition [F29]. Patient admitted on a status of Physician Emergency Certificate (PEC). Gerardo reports no known food or drug allergies.  Patient angry saying that his mother is trying to feed him pills to poison him.  He says Dr. Escobedo is his doctor and he is trying to wean his off his Abilify and Seroquel using medical marijuana.     Patient demonstrated an affect that was irritable, agitated, angry, expansive, and  labile. Patient demonstrated mood during assessment that was angry and anxious. Patient had an appearance that was disheveled and poor hygiene.  Patient denies suicidal ideation. Patient denies suicide plan. Patient denies hallucinations.    Patient refused to sign any of his consents during the interview process because he will be leaving shortly.    Cars  height is 5' 6" (1.676 m) and weight is 70.3 kg (155 lb). His temperature is 97.9 °F (36.6 °C). His blood pressure is 92/63 and his pulse is 82. His respiration is 18 and oxygen saturation is 96%.     Cars last BM was noted on: 05/10/23    Metal detector screening performed via security personnel. The result of the scan was no metal detected. . Head-to-toe physical assessment completed with the following findings:  Patient had numerous healed acne scars through out his back, legs, thighs and arms.  No breaks in the skin or tattoos noted.  No injuries found upon body screen. A full skin assessment was performed. Cars skin appeared intact.  Gerardo was oriented to unit, staff, peers, and room. Patient belongings/valuables stored in locked intake room cabinet and changes of clothing provided to patient. Gerardo was placed on Q 15 min observations.      "

## 2023-05-11 NOTE — GROUP NOTE
Group Psychotherapy       Group Focus: Promoting Healthy Lifestyles and Life Skills   Gratitude Activity: Patient will be able to identify various things that they are grateful for. Patient, peers, and therapist will engage in a healthy conversation surrounding the topic gratitude. Patient will be able to give and receive positive feedback from peers and therapist. Patient will be able to positively and effectively interact and engage in group session.    Number of patients in attendance: 6    Group Start Time: 1000  Group End Time:  1045  Groups Date: 5/11/2023  Group Topic:  Behavioral Health  Group Department: [facility]  Group Facilitators:  Monae Wang  _____________________________________________________________________    Patient Name: Gerardo oLpez  MRN: [id number]  Patient Class: IP- Psych   Admission Date\Time: 5/11/2023 12:31 AM  Hospital Length of Stay: 0  Primary Care Provider: Primary Doctor No     Referred by:      Target symptoms: Psychosis     Patient's response to treatment: Not Participating     Progress toward goals: Not progressing     Interval History:      Diagnosis:     Plan: Therapist will continue to encourage patient to attend daily group sessions. Patient will engage adequately and effectively in group setting.

## 2023-05-12 PROCEDURE — 63700000 PHARM REV CODE 250 ALT 637 W/O HCPCS: Performed by: FAMILY MEDICINE

## 2023-05-12 PROCEDURE — 12400001 HC PSYCH SEMI-PRIVATE ROOM

## 2023-05-12 PROCEDURE — 25000003 PHARM REV CODE 250: Performed by: FAMILY MEDICINE

## 2023-05-12 PROCEDURE — 25000003 PHARM REV CODE 250

## 2023-05-12 RX ORDER — AZITHROMYCIN 250 MG/1
250 TABLET, FILM COATED ORAL DAILY
Status: COMPLETED | OUTPATIENT
Start: 2023-05-13 | End: 2023-05-16

## 2023-05-12 RX ORDER — GUAIFENESIN/DEXTROMETHORPHAN 100-10MG/5
10 SYRUP ORAL EVERY 4 HOURS PRN
Status: DISCONTINUED | OUTPATIENT
Start: 2023-05-12 | End: 2023-05-18 | Stop reason: HOSPADM

## 2023-05-12 RX ORDER — BENZONATATE 100 MG/1
100 CAPSULE ORAL 3 TIMES DAILY PRN
Status: DISCONTINUED | OUTPATIENT
Start: 2023-05-12 | End: 2023-05-18 | Stop reason: HOSPADM

## 2023-05-12 RX ORDER — AZITHROMYCIN 250 MG/1
500 TABLET, FILM COATED ORAL ONCE
Status: COMPLETED | OUTPATIENT
Start: 2023-05-12 | End: 2023-05-12

## 2023-05-12 RX ADMIN — GUAIFENESIN SYRUP AND DEXTROMETHORPHAN 10 ML: 100; 10 SYRUP ORAL at 02:05

## 2023-05-12 RX ADMIN — QUETIAPINE FUMARATE 100 MG: 100 TABLET ORAL at 08:05

## 2023-05-12 RX ADMIN — AZITHROMYCIN MONOHYDRATE 500 MG: 250 TABLET ORAL at 02:05

## 2023-05-12 RX ADMIN — ARIPIPRAZOLE 30 MG: 5 TABLET ORAL at 09:05

## 2023-05-12 RX ADMIN — GUAIFENESIN SYRUP AND DEXTROMETHORPHAN 10 ML: 100; 10 SYRUP ORAL at 06:05

## 2023-05-12 NOTE — NURSING
"PRN Medication Follow-up Note:    Behavior:    Patient (Gerardo Lopez is a 52 y.o. male, : 1970, MRN: [id number])     Allergies: Patient has no known allergies.    Cars  height is 5' 6" (1.676 m) and weight is 70.3 kg (155 lb). His oral temperature is 98.4 °F (36.9 °C). His blood pressure is 134/85 and his pulse is 90. His respiration is 18 and oxygen saturation is 99%.     Administered Robitussin DM 10 cc PO PRN per physician order to Gerardo       Intervention:    Intervention to Gerardo's response: relief of cough.      Response:    Gerardo's response: relief of cough.    Plan:     Continue to monitor per MD/PA/APRN orders; and reevaluate medication effectiveness within 30 minutes.    "

## 2023-05-12 NOTE — NURSING
"Daily Nursing Note:      Behavior:    Patient (Gerardo Lopez is a 52 y.o. male, : 1970, MRN: [id number]) demonstrating an affect that was dysphoric. Gerardo demonstrating mood that is irritable, restless. Gerardo had an appearance that was disheveled. Gerardo denies suicidal ideation. Gerardo denies suicide plan. Gerardo denies homicidal ideation. Gerardo denies hallucinations. Does appear to be guarded when interaction initiated, some general paranoia, illogical thoughts.    Gerardo's  height is 5' 6" (1.676 m) and weight is 70.3 kg (155 lb). His oral temperature is 98.2 °F (36.8 °C). His blood pressure is 138/87 and his pulse is 99. His respiration is 18 and oxygen saturation is 99%.     Cars last BM was noted on: 05/10/2023.      Intervention:    Encourage Gerardo to perform self-hygiene, grooming, and changing of clothing. Monitor Cars behavior and program compliance. Monitor Gerardo for suicidal ideation, homicidal ideation, sleep disturbance, and hallucinations. Encourage Gerardo to eat all portions of meals and assess for meal preferences. Monitor Gerardo for intake and output to ensure hydration. Notify the Physician/Physician Assistant/Advance Practice Registered Nurse (MD/PA/APRN) for any medication refusal and any change in patient condition.      Response:    Gerardo verbalizes understand of unit process and procedures. Gerardo is compliant with medications prescribed. Accepting of education offered. Guarded during interactions.       Plan:     Continue to monitor per MD/PA/APRN orders; maintain patient safety.   "

## 2023-05-12 NOTE — CONSULTS
Progress Note    Admit Date: 5/11/2023   LOS: 1 day     SUBJECTIVE:   Consulted to see patient for cough.  Patient reports he has been coughing for about 3 days now.  He states he was beginning to get sick just prior to presenting to the emergency room.  He states he is producing mucus, he is unsure of the color.  He is not running fever, he does have a history of smoking daily.  He reports he was negative for COVID in the emergency room about 3 days ago.  Scheduled Meds:   ARIPiprazole  30 mg Oral Daily    nicotine  1 patch Transdermal Daily    QUEtiapine  100 mg Oral QHS     Continuous Infusions:  PRN Meds:acetaminophen, aluminum-magnesium hydroxide-simethicone, dextromethorphan-guaiFENesin  mg/5 ml, haloperidoL **AND** diphenhydrAMINE **AND** LORazepam **AND** haloperidol lactate **AND** diphenhydrAMINE **AND** lorazepam, hydrOXYzine HCL, ondansetron, promethazine    Review of patient's allergies indicates:  No Known Allergies    Review of Systems  Review of Systems   Constitutional:  Negative for fever.   HENT:  Negative for congestion, sinus pain and sore throat.    Respiratory:  Positive for cough and sputum production. Negative for shortness of breath and wheezing.    Cardiovascular:  Negative for chest pain and leg swelling.   Gastrointestinal:  Negative for constipation, diarrhea, nausea and vomiting.   Skin:  Negative for rash.   Neurological:  Negative for headaches.      OBJECTIVE:     Vital Signs (Most Recent)  Temp: 98.4 °F (36.9 °C) (05/12/23 0900)  Pulse: 90 (05/12/23 0900)  Resp: 18 (05/12/23 0900)  BP: 134/85 (05/12/23 0900)  SpO2: 99 % (05/12/23 0900)    Vital Signs Range (Last 24H):  Temp:  [98.2 °F (36.8 °C)-98.4 °F (36.9 °C)]   Pulse:  [90-99]   Resp:  [18]   BP: (119-138)/(83-87)   SpO2:  [98 %-99 %]     I & O (Last 24H):No intake or output data in the 24 hours ending 05/12/23 1330  Physical Exam:  Physical Exam  Constitutional:       Appearance: Normal appearance.   HENT:      Head:  Normocephalic and atraumatic.      Nose: No congestion or rhinorrhea.      Mouth/Throat:      Mouth: Mucous membranes are moist.      Pharynx: No oropharyngeal exudate or posterior oropharyngeal erythema.   Eyes:      Conjunctiva/sclera: Conjunctivae normal.   Cardiovascular:      Rate and Rhythm: Normal rate and regular rhythm.      Heart sounds: No murmur heard.  Pulmonary:      Effort: No respiratory distress.      Breath sounds: No stridor. Wheezing (mild , scattered at lung bases) present.   Musculoskeletal:      Right lower leg: No edema.      Left lower leg: No edema.   Neurological:      Mental Status: He is alert.        Laboratory:  No results found for this or any previous visit (from the past 24 hour(s)).         ASSESSMENT/PLAN:   1. Cough-likely bronchitis-we will go ahead and treat with some oral antibiotics due to high risk of bronchitis secondary to history of smoking.  Will also provide Robitussin DM as needed for cough during the day and can have Tessalon Perles for cough refractory to Robitussin.  Continue to monitor for any symptom progression.

## 2023-05-12 NOTE — GROUP NOTE
Group Psychotherapy       Group Focus: Psychiatric education-medication      Number of patients in attendance: 16    Group Start Time: 2030  Group End Time:  2100  Groups Date: 5/11/2023  Group Topic:  Behavioral Health  Group Department: [facility] - Behavioral Health Unit  Group Facilitators:  Olman Caraballo RN  _____________________________________________________________________    Patient Name: Gerardo Lopez  MRN: [id number]  Patient Class: IP- Psych   Admission Date\Time: 5/11/2023 12:31 AM  Hospital Length of Stay: 1  Primary Care Provider: Primary Doctor No     Referred by: Acute Psychiatry Unit Treatment Team     Target symptoms: Psychosis     Patient's response to treatment: Active Listening     Progress toward goals: Progressing slowly     Interval History:      Diagnosis: unspecified psychotic disorder     Plan: Continue treatment on APU

## 2023-05-12 NOTE — NURSING
"PRN Administration Note:    Behavior:    Patient (Gerardo Lopez is a 52 y.o. male, : 1970, MRN: [id number])     Allergies: Patient has no known allergies.    Gerardo's  height is 5' 6" (1.676 m) and weight is 70.3 kg (155 lb). His oral temperature is 98.4 °F (36.9 °C). His blood pressure is 134/85 and his pulse is 90. His respiration is 18 and oxygen saturation is 99%.     Reason for PRN Administration: cough.    Intervention:    Administered Robitussin DM 10 cc PO PRN per physician order to Gerardo       Response:    Gerardo tolerated administration well.      Plan:     Continue to monitor per MD/PA/APRN orders; and reevaluate medication effectiveness within 30 minutes.    "

## 2023-05-12 NOTE — CARE UPDATE
"CM spoke with pt's mother she stated that He's a  and doesn't want to put "harmful" things in his body.  He doesn't believe that he is schizophrenic, so he doesn't want to take medication. When he doesn't take his meds he hallucinates and thinks that people are out to get him. Medication was being filled by his internist, Dr. Portillo.  "

## 2023-05-12 NOTE — PROGRESS NOTES
5/12/2023  Gerardo Lopez   1970   [id number]        Psychiatry Progress Note     Chief Complaint: I'm doing great today    SUBJECTIVE:   Gerardo Lopez is a 52 y.o. male placed under a PEC at [facility] by OPC from mother after apparently being non compliant with medications and having an exacerbation of symptoms.     CM spoke with [facility] and was told that patient had not been seen there in over a year. She also spoke with mother who stated that he is a  and does not want to take his medications, however when he doesn't take them he begins to hallucinate more. They also stated that they have no recollection of telling him he could stop taking his medication. He is still focused on that today. He did state that the reduction of Seroquel was much more tolerable and he states that he slept better last night than he has in a while. He remains grandiose and elevated. I will continue with current POC as I do not believe patient was compliant with medications at home. We will monitor his progress over the weekend and adjust his medications as needed.        Current Medications:   Scheduled Meds:    ARIPiprazole  30 mg Oral Daily    nicotine  1 patch Transdermal Daily    QUEtiapine  100 mg Oral QHS      PRN Meds: acetaminophen, aluminum-magnesium hydroxide-simethicone, haloperidoL **AND** diphenhydrAMINE **AND** LORazepam **AND** haloperidol lactate **AND** diphenhydrAMINE **AND** lorazepam, hydrOXYzine HCL, ondansetron, promethazine   Psychotherapeutics (From admission, onward)      Start     Stop Route Frequency Ordered    05/11/23 2100  QUEtiapine tablet 100 mg         -- Oral Nightly 05/11/23 0935    05/11/23 1045  ARIPiprazole tablet 30 mg         -- Oral Daily 05/11/23 0935    05/11/23 0100  haloperidoL tablet 10 mg  (Med - Acute  Behavioral Management)        See Hyperspace for full Linked Orders Report.    -- Oral Every 4 hours PRN 05/11/23 0100    05/11/23 0100  LORazepam tablet 2 mg  (Med -  Acute  Behavioral Management)        See Hyperspace for full Linked Orders Report.    -- Oral Every 4 hours PRN 05/11/23 0100    05/11/23 0100  haloperidol lactate injection 10 mg  (Med - Acute  Behavioral Management)        See Hyperspace for full Linked Orders Report.    -- IM Every 4 hours PRN 05/11/23 0100    05/11/23 0100  LORazepam injection 2 mg  (Med - Acute  Behavioral Management)        See Hyperspace for full Linked Orders Report.    -- IM Every 4 hours PRN 05/11/23 0100            Allergies:   Review of patient's allergies indicates:  No Known Allergies     OBJECTIVE:   Vitals   Vitals:    05/12/23 0900   BP: 134/85   Pulse: 90   Resp: 18   Temp: 98.4 °F (36.9 °C)        Labs/Imaging/Studies:   Recent Results (from the past 36 hour(s))   Hemoglobin A1C    Collection Time: 05/11/23  7:28 AM   Result Value Ref Range    Hemoglobin A1c 5.5 <=7.0 %    Estimated Average Glucose 111.2 mg/dL   CBC with Differential    Collection Time: 05/11/23  7:28 AM   Result Value Ref Range    WBC 7.14 4.50 - 11.50 x10(3)/mcL    RBC 5.45 4.70 - 6.10 x10(6)/mcL    Hgb 16.6 14.0 - 18.0 g/dL    Hct 50.2 42.0 - 52.0 %    MCV 92.1 80.0 - 94.0 fL    MCH 30.5 27.0 - 31.0 pg    MCHC 33.1 33.0 - 36.0 g/dL    RDW 15.7 11.5 - 17.0 %    Platelet 309 130 - 400 x10(3)/mcL    MPV 9.4 7.4 - 10.4 fL    Neut % 57.9 %    Lymph % 25.8 %    Mono % 14.0 %    Eos % 1.3 %    Basophil % 0.7 %    Lymph # 1.84 0.6 - 4.6 x10(3)/mcL    Neut # 4.14 2.1 - 9.2 x10(3)/mcL    Mono # 1.00 0.1 - 1.3 x10(3)/mcL    Eos # 0.09 0 - 0.9 x10(3)/mcL    Baso # 0.05 <=0.2 x10(3)/mcL    IG# 0.02 0 - 0.04 x10(3)/mcL    IG% 0.3 %    NRBC% 0.0 %   Lipid panel    Collection Time: 05/11/23  7:28 AM   Result Value Ref Range    Cholesterol Total 307 (H) <=200 mg/dL    HDL Cholesterol 45 35 - 60 mg/dL    Triglyceride 166 (H) 34 - 140 mg/dL    Cholesterol/HDL Ratio 7 (H) 0 - 5    Very Low Density Lipoprotein 33     LDL Cholesterol 229.00 (H) 50.00 - 140.00 mg/dL   Comprehensive  metabolic panel    Collection Time: 05/11/23  7:28 AM   Result Value Ref Range    Sodium Level 142 136 - 145 mmol/L    Potassium Level 5.1 3.5 - 5.1 mmol/L    Chloride 105 98 - 107 mmol/L    Carbon Dioxide 27 22 - 29 mmol/L    Glucose Level 86 74 - 100 mg/dL    Blood Urea Nitrogen 10.1 8.4 - 25.7 mg/dL    Creatinine 0.85 0.73 - 1.18 mg/dL    Calcium Level Total 9.7 8.4 - 10.2 mg/dL    Protein Total 7.0 6.4 - 8.3 gm/dL    Albumin Level 3.9 3.5 - 5.0 g/dL    Globulin 3.1 2.4 - 3.5 gm/dL    Albumin/Globulin Ratio 1.3 1.1 - 2.0 ratio    Bilirubin Total 0.3 <=1.5 mg/dL    Alkaline Phosphatase 66 40 - 150 unit/L    Alanine Aminotransferase 17 0 - 55 unit/L    Aspartate Aminotransferase 16 5 - 34 unit/L    eGFR >60 mls/min/1.73/m2   TSH    Collection Time: 05/11/23  7:28 AM   Result Value Ref Range    Thyroid Stimulating Hormone 0.865 0.350 - 4.940 uIU/mL   SYPHILIS ANTIBODY (WITH REFLEX RPR)    Collection Time: 05/11/23  7:28 AM   Result Value Ref Range    Syphilis Antibody Nonreactive Nonreactive, Equivocal          Medical Review Of Systems:  A comprehensive review of systems was negative except for: Gastrointestinal: positive for vomiting      Psychiatric Mental Status Exam:  General Appearance: well-nourished, normal weight, dressed in hospital garb, appears older than stated age, poorly groomed, disheveled  Arousal: alert  Behavior: cooperative, appropriate eye-contact, restless and fidgety, Defiant but somewhat redirectable  Movements and Motor Activity: no abnormal involuntary movements noted; no tics, no tremors, no akathisia, no dystonia, no evidence of tardive dyskinesia; no psychomotor agitation or retardation  Orientation: intact; oriented fully to person, place, time and situation  Speech: normal volume, conversational, spontaneous, interruptible, pressured, refuses to speak on topics that go against his beliefs or feelings  Mood: Manic, Irritable, and Dysphoric  Affect: dysphoric, irritable, expansive,  bizarre  Thought Process: goal-directed, organized,but illogical  Associations: intact, no loosening of associations  Thought Content and Perceptions: no suicidal or homicidal ideation, no auditory or visual hallucinations, probable paranoid ideation, no ideas of reference, (+) evidence of delusions or psychosis  Recent and Remote Memory: intact; per interview/observation with patient  Attention and Concentration: intact; per interview/observation with patient  Fund of Knowledge: intact; based on history, vocabulary, fund of knowledge, syntax, grammar, and content  Insight: impaired due to illness ; based on understanding of severity of illness and HPI  Judgment: impaired due to illness ; based on patient's behavior and HPI    ASSESSMENT/PLAN:   Problems Addressed/Diagnoses:   Schizoaffective Disorder  Bipolar type (F25.0)  Amphetamine Abuse     Past Medical History:   Diagnosis Date    Addiction to drug     Bipolar disorder     History of psychiatric hospitalization     Psychiatric exam requested by authority     Schizoaffective disorder     Substance abuse         Plan:  Schizoaffective disorder  -Abilify 30 mg  -Seroquel to 100 mg     Amphetamine abuse  -Individual and group therapy       Expected Disposition Plan: Home        Azar GARCIA-BC

## 2023-05-12 NOTE — NURSING
"Daily Nursing Note:      Behavior:    Patient (Gerardo Lopez is a 52 y.o. male, : 1970, MRN: [id number]) demonstrating an affect that was sad, flat, anxious, and irritable. Gerardo demonstrating mood that is depressed and anxious. Gerardo had an appearance that was disheveled. Gerardo denies suicidal ideation. Gerardo denies suicide plan. Gerardo denies homicidal ideation. Gerardo denies hallucinations.    Gerardo's  height is 5' 6" (1.676 m) and weight is 70.3 kg (155 lb). His oral temperature is 98.4 °F (36.9 °C). His blood pressure is 134/85 and his pulse is 90. His respiration is 18 and oxygen saturation is 99%.     Cars last BM was noted on: 2023.      Intervention:    Encourage Gerardo to perform self-hygiene, grooming, and changing of clothing. Monitor Cars behavior and program compliance. Monitor Gerardo for suicidal ideation, homicidal ideation, sleep disturbance, and hallucinations. Encourage Gerardo to eat all portions of meals and assess for meal preferences. Monitor Gerardo for intake and output to ensure hydration. Notify the Physician/Physician Assistant/Advance Practice Registered Nurse (MD/PA/APRN) for any medication refusal and any change in patient condition.      Response:    Gerardo verbalizes understand of unit process and procedures.      Plan:     Continue to monitor per MD/PA/APRN orders; maintain patient safety.    "

## 2023-05-12 NOTE — GROUP NOTE
Group Psychotherapy       Group Focus: Medication Education.      Number of patients in attendance: 18    Group Start Time: 0800  Group End Time:  0900  Groups Date: 5/12/2023  Group Topic:  Behavioral Health  Group Department: [facility] Behavioral Health Unit  Group Facilitators:  Lenny Martin RN  _____________________________________________________________________    Patient Name: Gerardo Lopez  MRN: [id number]  Patient Class: IP- Psych   Admission Date\Time: 5/11/2023 12:31 AM  Hospital Length of Stay: 1  Primary Care Provider: Primary Doctor No     Referred by: Acute Psychiatry Unit Treatment Team     Target symptoms: Depression, Anxiety, and Psychosis     Patient's response to treatment: Active Listening     Progress toward goals: Progressing well     Interval History:      Diagnosis: Psychosis.     Plan: Continue treatment on APU

## 2023-05-12 NOTE — GROUP NOTE
Group Psychotherapy       Group Focus: Communication Skills    Group topic: Communication Skills: Therapist explored patients need for increasing communication skills through assertiveness or active listening. Patient was able to discuss examples of bad communication and methods to improve communication in their own lives. Patient continues to make progress towards treatment goals.     Number of patients in attendance: 5    Group Start Time: 1045  Group End Time:  1130  Groups Date: 5/12/2023  Group Topic:  Behavioral Health  Group [facility]  Group Facilitators:  Elizabeth Santiago  _____________________________________________________________________    Patient Name: Gerardo Lopez  MRN: [id number]  Patient Class: IP- Psych   Admission Date\Time: 5/11/2023 12:31 AM  Hospital Length of Stay: 1  Primary Care Provider: Primary Doctor No     Referred by: Acute Psychiatry Unit Treatment Team     Target symptoms: Substance Abuse     Patient's response to treatment: Did not attend group     Progress toward goals: Not progressing     Interval History:      Diagnosis:      Plan: Continue treatment on APU

## 2023-05-12 NOTE — GROUP NOTE
Group Psychotherapy       Group Focus: Psychiatric education-medication      Number of patients in attendance: 16    Group Start Time: 2030  Group End Time:  2100  Groups Date: 5/12/2023  Group Topic:  Behavioral Health  Group Department: [facility] Behavioral Health Unit  Group Facilitators:  Olman Caraballo RN  _____________________________________________________________________    Patient Name: Gerardo Lopez  MRN: [id number]  Patient Class: IP- Psych   Admission Date\Time: 5/11/2023 12:31 AM  Hospital Length of Stay: 1  Primary Care Provider: Primary Doctor No     Referred by: {GWENDOLYN BURNS REFERRED BY:02401}     Target symptoms: {Target Symptoms:35896}     Patient's response to treatment: {PSY GRANT PATIENT'S RESPONSE:21904}     Progress toward goals: {PSJOANIE BURNS PROGRESS TOWARD GOALS:87956}     Interval History: ***     Diagnosis: ***     Plan: {GWENDOLYN BURNS PLAN:19104}

## 2023-05-13 PROCEDURE — 12400001 HC PSYCH SEMI-PRIVATE ROOM

## 2023-05-13 PROCEDURE — 25000003 PHARM REV CODE 250

## 2023-05-13 PROCEDURE — 25000003 PHARM REV CODE 250: Performed by: FAMILY MEDICINE

## 2023-05-13 PROCEDURE — 63700000 PHARM REV CODE 250 ALT 637 W/O HCPCS: Performed by: FAMILY MEDICINE

## 2023-05-13 RX ADMIN — GUAIFENESIN SYRUP AND DEXTROMETHORPHAN 10 ML: 100; 10 SYRUP ORAL at 04:05

## 2023-05-13 RX ADMIN — GUAIFENESIN SYRUP AND DEXTROMETHORPHAN 10 ML: 100; 10 SYRUP ORAL at 09:05

## 2023-05-13 RX ADMIN — GUAIFENESIN SYRUP AND DEXTROMETHORPHAN 10 ML: 100; 10 SYRUP ORAL at 01:05

## 2023-05-13 RX ADMIN — AZITHROMYCIN MONOHYDRATE 250 MG: 250 TABLET ORAL at 08:05

## 2023-05-13 RX ADMIN — ARIPIPRAZOLE 30 MG: 5 TABLET ORAL at 08:05

## 2023-05-13 RX ADMIN — QUETIAPINE FUMARATE 100 MG: 100 TABLET ORAL at 08:05

## 2023-05-13 NOTE — NURSING
"PRN Medication Follow-up Note:    Behavior:    Patient (Gerardo Lopez is a 52 y.o. male, : 1970, MRN: [id number])     Allergies: Patient has no known allergies.    Cars  height is 5' 6" (1.676 m) and weight is 70.3 kg (155 lb). His oral temperature is 98.6 °F (37 °C). His blood pressure is 118/78 and his pulse is 84. His respiration is 18 and oxygen saturation is 99%.     Administered Robitussin 10 cc orally per physician order to Gerardo       Intervention:    Intervention to Gerardo's response: Effective.       Response:    Gerardo's response: No further complaints of coughing      Plan:     Continue to monitor per MD/PA/APRN orders; and reevaluate medication effectiveness within 30 minutes.   "

## 2023-05-13 NOTE — NURSING
"Daily Nursing Note:      Behavior:    Patient (Gerardo Lopez is a 52 y.o. male, : 1970, MRN: [id number]) demonstrating an affect that was flat. Gerardo demonstrating mood that is depressed and anxious. Gerardo had an appearance that was disheveled and poor hygiene. Gerardo denies suicidal ideation. Gerardo denies suicide plan. Gerardo denies homicidal ideation. Gerardo denies hallucinations. He is isolative and stayed in his room most of the morning    Patient uses one word answers when interacting with nurse.  He says he is afraid of germs and he is glad he is on antibiotics.      Gerardo's  height is 5' 6" (1.676 m) and weight is 70.3 kg (155 lb). His oral temperature is 98.1 °F (36.7 °C). His blood pressure is 132/87 and his pulse is 79. His respiration is 18 and oxygen saturation is 99%.     Cars last BM was noted on: 23      Intervention:    Encourage Gerardo to perform self-hygiene, grooming, and changing of clothing. Monitor Gerardo's behavior and program compliance. Monitor Gerardo for suicidal ideation, homicidal ideation, sleep disturbance, and hallucinations. Encourage Gerardo to eat all portions of meals and assess for meal preferences. Monitor Gerardo for intake and output to ensure hydration. Notify the Physician/Physician Assistant/Advance Practice Registered Nurse (MD/PA/APRN) for any medication refusal and any change in patient condition.      Response:    Gerardo verbalizes understand of unit process and procedures. Gerardo reported medications effective.      Plan:     Continue to monitor per MD/PA/APRN orders; maintain patient safety.   "

## 2023-05-13 NOTE — NURSING
"PRN Administration Note:    Behavior:    Patient (Gerardo Lopez is a 52 y.o. male, : 1970, MRN: [id number])     Allergies: Patient has no known allergies.    Gerardo's  height is 5' 6" (1.676 m) and weight is 70.3 kg (155 lb). His oral temperature is 98.6 °F (37 °C). His blood pressure is 118/78 and his pulse is 84. His respiration is 18 and oxygen saturation is 99%.     Reason for PRN Administration: Coughing, non-productive.    Intervention:    Administered Robitussin 10cc orally per physician order to Gerardo       Response:    Gerardo tolerated administration well.      Plan:     Continue to monitor per MD/PA/APRN orders; and reevaluate medication effectiveness within 30 minutes.   "

## 2023-05-13 NOTE — NURSING
"Daily Nursing Note:      Behavior:    Patient (Gerardo Lopez is a 52 y.o. male, : 1970, MRN: [id number]) demonstrating an affect that was sad and flat. Gerardo demonstrating mood that is depressed and anxious. Gerardo had an appearance that was disheveled. Gerardo denies suicidal ideation. Gerardo denies suicide plan. Gerardo denies homicidal ideation. Gerardo denies hallucinations.    Gerardo's  height is 5' 6" (1.676 m) and weight is 70.3 kg (155 lb). His oral temperature is 98.2 °F (36.8 °C). His blood pressure is 120/84 and his pulse is 84. His respiration is 18 and oxygen saturation is 97%.     Gerardo's last BM was noted on: 23___      Intervention:    Encourage Gerardo to perform self-hygiene, grooming, and changing of clothing. Monitor Gerardo's behavior and program compliance. Monitor Gerardo for suicidal ideation, homicidal ideation, sleep disturbance, and hallucinations. Encourage Gerardo to eat all portions of meals and assess for meal preferences. Monitor Gerardo for intake and output to ensure hydration. Notify the Physician/Physician Assistant/Advance Practice Registered Nurse (MD/PA/APRN) for any medication refusal and any change in patient condition.      Response:    Gerardo verbalizes understand of unit process and procedures.      Plan:     Continue to monitor per MD/PA/APRN orders; maintain patient safety.    "

## 2023-05-14 PROCEDURE — 25000003 PHARM REV CODE 250: Performed by: FAMILY MEDICINE

## 2023-05-14 PROCEDURE — 12400001 HC PSYCH SEMI-PRIVATE ROOM

## 2023-05-14 PROCEDURE — 25000003 PHARM REV CODE 250

## 2023-05-14 PROCEDURE — 63700000 PHARM REV CODE 250 ALT 637 W/O HCPCS: Performed by: FAMILY MEDICINE

## 2023-05-14 RX ADMIN — AZITHROMYCIN MONOHYDRATE 250 MG: 250 TABLET ORAL at 08:05

## 2023-05-14 RX ADMIN — ARIPIPRAZOLE 30 MG: 5 TABLET ORAL at 08:05

## 2023-05-14 RX ADMIN — GUAIFENESIN SYRUP AND DEXTROMETHORPHAN 10 ML: 100; 10 SYRUP ORAL at 08:05

## 2023-05-14 RX ADMIN — BENZONATATE 100 MG: 100 CAPSULE ORAL at 08:05

## 2023-05-14 RX ADMIN — QUETIAPINE FUMARATE 100 MG: 100 TABLET ORAL at 08:05

## 2023-05-14 NOTE — GROUP NOTE
Group Psychotherapy       Group Focus: Promoting Healthy Lifestyles      Number of patients in attendance: 18       Group Start Time: 1430  Group End Time:  1500  Groups Date: 5/14/2023  Group Topic:  Behavioral Health  Group Department: [facility] Behavioral Health Unit  Group Facilitators:  Maday Aguiar RN  _____________________________________________________________________    Patient Name: Gerardo Lopez  MRN: [id number]  Patient Class: IP- Psych   Admission Date\Time: 5/11/2023 12:31 AM  Hospital Length of Stay: 3  Primary Care Provider: Primary Doctor No     Referred by: Acute Psychiatry Unit Treatment Team     Target symptoms: Anxiety     Patient's response to treatment: Self-disclosure and Frequent Questions     Progress toward goals: Progressing well     Interval History:      Diagnosis: Psycosis     Plan: Continue treatment on APU     no

## 2023-05-14 NOTE — PLAN OF CARE
Problem: Adult Inpatient Plan of Care  Goal: Plan of Care Review  Outcome: Ongoing, Not Progressing  Goal: Patient-Specific Goal (Individualized)  Outcome: Ongoing, Not Progressing  Goal: Absence of Hospital-Acquired Illness or Injury  Outcome: Ongoing, Not Progressing  Goal: Optimal Comfort and Wellbeing  Outcome: Ongoing, Not Progressing  Goal: Readiness for Transition of Care  Outcome: Ongoing, Not Progressing     Problem: Violence Risk or Actual  Goal: Anger and Impulse Control  Outcome: Ongoing, Not Progressing     Problem: Behavior Regulation Impairment (Excessive Substance Use)  Goal: Improved Behavioral Control (Excessive Substance Use)  Outcome: Ongoing, Not Progressing     Problem: Decreased Participation and Engagement (Excessive Substance Use)  Goal: Increased Participation and Engagement (Excessive Substance Use)  Outcome: Ongoing, Not Progressing     Problem: Decreased Participation and Engagement (Psychotic Signs/Symptoms)  Goal: Increased Participation and Engagement (Psychotic Signs/Symptoms)  Outcome: Ongoing, Not Progressing     Problem: Mood Impairment (Psychotic Signs/Symptoms)  Goal: Improved Mood Symptoms (Psychotic Signs/Symptoms)  Outcome: Ongoing, Not Progressing

## 2023-05-14 NOTE — NURSING
"Daily Nursing Note:      Behavior:    Patient (Gerardo Lopez is a 52 y.o. male, : 1970, MRN: [id number]) demonstrating an affect that was anxious. Gerardo demonstrating mood that is anxious. Gerardo had an appearance that was disheveled. Gerardo denies suicidal ideation. Gerardo denies suicide plan. Gerardo denies homicidal ideation. Gerardo denies hallucinations.    Gerardo's  height is 5' 6" (1.676 m) and weight is 70.3 kg (155 lb). His temperature is 98.8 °F (37.1 °C). His blood pressure is 130/83 and his pulse is 79. His respiration is 20 and oxygen saturation is 98%.     Cars last BM was noted on: 23      Intervention:    Encourage Gerardo to perform self-hygiene, grooming, and changing of clothing. Monitor Cars behavior and program compliance. Monitor Gerardo for suicidal ideation, homicidal ideation, sleep disturbance, and hallucinations. Encourage Gerardo to eat all portions of meals and assess for meal preferences. Monitor Gerardo for intake and output to ensure hydration. Notify the Physician/Physician Assistant/Advance Practice Registered Nurse (MD/PA/APRN) for any medication refusal and any change in patient condition.      Response:    Gerardo verbalizes understand of unit process and procedures. Gerardo reported medications effective.      Plan:     Continue to monitor per MD/PA/APRN orders; maintain patient safety.   "

## 2023-05-14 NOTE — NURSING
"PRN Medication Follow-up Note:    Behavior:    Patient (Gerardo Lopez is a 52 y.o. male, : 1970, MRN: [id number])     Allergies: Patient has no known allergies.    Gerardo's  height is 5' 6" (1.676 m) and weight is 70.3 kg (155 lb). His temperature is 98.2 °F (36.8 °C). His blood pressure is 129/83 and his pulse is 66. His respiration is 18 and oxygen saturation is 99%.     Administered ____guifenesin 10ml po___ per physician order to Gerardo       Intervention:    Intervention to Gerardo's response: tolerated well_________.       Response:    Gerardo's response: __helped some._      Plan:     Continue to monitor per MD/PA/APRN orders; and reevaluate medication effectiveness within 30 minutes.   "

## 2023-05-14 NOTE — PLAN OF CARE
Problem: Adult Inpatient Plan of Care  Goal: Plan of Care Review  Outcome: Met  Goal: Patient-Specific Goal (Individualized)  Outcome: Met  Goal: Absence of Hospital-Acquired Illness or Injury  Outcome: Met  Goal: Optimal Comfort and Wellbeing  Outcome: Met  Goal: Readiness for Transition of Care  Outcome: Met     Problem: Violence Risk or Actual  Goal: Anger and Impulse Control  Outcome: Met     Problem: Behavior Regulation Impairment (Excessive Substance Use)  Goal: Improved Behavioral Control (Excessive Substance Use)  Outcome: Ongoing, Progressing     Problem: Decreased Participation and Engagement (Excessive Substance Use)  Goal: Increased Participation and Engagement (Excessive Substance Use)  Outcome: Ongoing, Progressing     Problem: Decreased Participation and Engagement (Psychotic Signs/Symptoms)  Goal: Increased Participation and Engagement (Psychotic Signs/Symptoms)  Outcome: Ongoing, Progressing     Problem: Mood Impairment (Psychotic Signs/Symptoms)  Goal: Improved Mood Symptoms (Psychotic Signs/Symptoms)  Outcome: Ongoing, Progressing

## 2023-05-14 NOTE — NURSING
"PRN Administration Note:    Behavior:    Patient (Gerardo Lopez is a 52 y.o. male, : 1970, MRN: [id number])     Allergies: Patient has no known allergies.    Gerardo's  height is 5' 6" (1.676 m) and weight is 70.3 kg (155 lb). His temperature is 98.2 °F (36.8 °C). His blood pressure is 129/83 and his pulse is 66. His respiration is 18 and oxygen saturation is 99%.     Reason for PRN Administration: cough________.    Intervention:    Administered ____guifenesin 10ml po___ per physician order to Gerardo       Response:    Gerardo tolerated administration well.      Plan:     Continue to monitor per MD/PA/APRN orders; and reevaluate medication effectiveness within 30 minutes.    "

## 2023-05-14 NOTE — NURSING
"Daily Nursing Note:      Behavior:    Patient (Gerardo Lopez is a 52 y.o. male, : 1970, MRN: [id number]) demonstrating an affect that was flat. Gerardo demonstrating mood that is depressed and anxious. Gerardo had an appearance that was disheveled. Gerardo denies suicidal ideation. Gerardo denies suicide plan. Gerardo denies homicidal ideation. Gerardo denies hallucinations.    Gerardo's  height is 5' 6" (1.676 m) and weight is 70.3 kg (155 lb). His oral temperature is 98.1 °F (36.7 °C). His blood pressure is 132/87 and his pulse is 79. His respiration is 18 and oxygen saturation is 99%.         Intervention:    Encourage Gerardo to perform self-hygiene, grooming, and changing of clothing. Monitor Gerardo's behavior and program compliance. Monitor Gerardo for suicidal ideation, homicidal ideation, sleep disturbance, and hallucinations. Encourage Gerardo to eat all portions of meals and assess for meal preferences. Monitor Gerardo for intake and output to ensure hydration. Notify the Physician/Physician Assistant/Advance Practice Registered Nurse (MD/PA/APRN) for any medication refusal and any change in patient condition.      Response:    Gerardo verbalizes understand of unit process and procedures. Gerardo reported medications are helping with anxiety      Plan:     Continue to monitor per MD/PA/APRN orders; maintain patient safety.    "

## 2023-05-15 PROCEDURE — 12400001 HC PSYCH SEMI-PRIVATE ROOM

## 2023-05-15 PROCEDURE — 25000003 PHARM REV CODE 250

## 2023-05-15 PROCEDURE — 63700000 PHARM REV CODE 250 ALT 637 W/O HCPCS: Performed by: FAMILY MEDICINE

## 2023-05-15 PROCEDURE — 25000003 PHARM REV CODE 250: Performed by: FAMILY MEDICINE

## 2023-05-15 RX ADMIN — ARIPIPRAZOLE 30 MG: 5 TABLET ORAL at 08:05

## 2023-05-15 RX ADMIN — GUAIFENESIN SYRUP AND DEXTROMETHORPHAN 10 ML: 100; 10 SYRUP ORAL at 04:05

## 2023-05-15 RX ADMIN — AZITHROMYCIN MONOHYDRATE 250 MG: 250 TABLET ORAL at 08:05

## 2023-05-15 RX ADMIN — GUAIFENESIN SYRUP AND DEXTROMETHORPHAN 10 ML: 100; 10 SYRUP ORAL at 09:05

## 2023-05-15 NOTE — NURSING
"Daily Nursing Note:      Behavior:    Patient (Gerardo Lopez is a 52 y.o. male, : 1970, MRN: [id number]) demonstrating an affect that was anxious. Gerardo demonstrating mood that is anxious. Gerardo had an appearance that was disheveled. Gerardo denies suicidal ideation. Gerardo denies suicide plan. Gerardo denies homicidal ideation. Gerardo denies hallucinations.    Gerardo's  height is 5' 6" (1.676 m) and weight is 70.3 kg (155 lb). His temperature is 99.1 °F (37.3 °C). His blood pressure is 130/87 and his pulse is 88. His respiration is 18 and oxygen saturation is 97%.     Gerardo's last BM was noted on: 23_____      Intervention:    Encourage Gerardo to perform self-hygiene, grooming, and changing of clothing. Monitor Gerardo's behavior and program compliance. Monitor Gerardo for suicidal ideation, homicidal ideation, sleep disturbance, and hallucinations. Encourage Gerardo to eat all portions of meals and assess for meal preferences. Monitor Gerardo for intake and output to ensure hydration. Notify the Physician/Physician Assistant/Advance Practice Registered Nurse (MD/PA/APRN) for any medication refusal and any change in patient condition.      Response:    Gerardo verbalizes understand of unit process and procedures.      Plan:     Continue to monitor per MD/PA/APRN orders; maintain patient safety.    "

## 2023-05-15 NOTE — PLAN OF CARE
Problem: Behavior Regulation Impairment (Excessive Substance Use)  Goal: Improved Behavioral Control (Excessive Substance Use)  Outcome: Ongoing, Progressing     Problem: Decreased Participation and Engagement (Excessive Substance Use)  Goal: Increased Participation and Engagement (Excessive Substance Use)  Outcome: Ongoing, Progressing     Problem: Decreased Participation and Engagement (Psychotic Signs/Symptoms)  Goal: Increased Participation and Engagement (Psychotic Signs/Symptoms)  Outcome: Ongoing, Progressing     Problem: Mood Impairment (Psychotic Signs/Symptoms)  Goal: Improved Mood Symptoms (Psychotic Signs/Symptoms)  Outcome: Ongoing, Progressing

## 2023-05-15 NOTE — PROGRESS NOTES
"5/15/2023  Gerardo Lopez   1970   [id number]        Psychiatry Progress Note     Chief Complaint: "I've been cleared of schizophrenia"    SUBJECTIVE:   Gerardo Lopez is a 52 y.o. male placed under a PEC at [facility] by OPC from mother after apparently being non compliant with medications and having an exacerbation of symptoms (believed that his mother was trying to poison him).    He came in on Abilify and Seroquel.  Seroquel has been decreased and will discontinue today since he is on 2 antipsychotics.  He has also been improving since becoming more compliant with his medications.  No tremors, rigidity, extrapyramidal symptoms, or excessive sedation were noted.  Will have PRN trazodone while here for insomnia.  His PCP was continuing his medications temporarily but he will need aftercare set up for discharge.      UDS: (+)amphetamine, cannabinoids  Blood alcohol: <10     Current Medications:   Scheduled Meds:    ARIPiprazole  30 mg Oral Daily    azithromycin  250 mg Oral Daily    nicotine  1 patch Transdermal Daily    QUEtiapine  100 mg Oral QHS      PRN Meds: acetaminophen, aluminum-magnesium hydroxide-simethicone, benzonatate, dextromethorphan-guaiFENesin  mg/5 ml, haloperidoL **AND** diphenhydrAMINE **AND** LORazepam **AND** haloperidol lactate **AND** diphenhydrAMINE **AND** lorazepam, hydrOXYzine HCL, ondansetron, promethazine   Psychotherapeutics (From admission, onward)      Start     Stop Route Frequency Ordered    05/11/23 2100  QUEtiapine tablet 100 mg         -- Oral Nightly 05/11/23 0935    05/11/23 1045  ARIPiprazole tablet 30 mg         -- Oral Daily 05/11/23 0935    05/11/23 0100  haloperidoL tablet 10 mg  (Med - Acute  Behavioral Management)        See Hyperspace for full Linked Orders Report.    -- Oral Every 4 hours PRN 05/11/23 0100    05/11/23 0100  LORazepam tablet 2 mg  (Med - Acute  Behavioral Management)        See Hyperspace for full Linked Orders Report.    -- Oral " "Every 4 hours PRN 05/11/23 0100    05/11/23 0100  haloperidol lactate injection 10 mg  (Med - Acute  Behavioral Management)        See Hyperspace for full Linked Orders Report.    -- IM Every 4 hours PRN 05/11/23 0100    05/11/23 0100  LORazepam injection 2 mg  (Med - Acute  Behavioral Management)        See Hyperspace for full Linked Orders Report.    -- IM Every 4 hours PRN 05/11/23 0100            Allergies:   Review of patient's allergies indicates:  No Known Allergies     OBJECTIVE:   Vitals   Vitals:    05/14/23 1900   BP: 130/87   Pulse: 88   Resp: 18   Temp: 99.1 °F (37.3 °C)        Labs/Imaging/Studies:   No results found for this or any previous visit (from the past 36 hour(s)).       Medical Review Of Systems:  Constitutional: negative  Respiratory: negative  Cardiovascular: negative  Gastrointestinal: negative  Genitourinary:negative  Musculoskeletal:negative  Neurological: negative      Psychiatric Mental Status Exam:  General Appearance: appears stated age, well-developed, well-nourished  Arousal: alert  Behavior: cooperative  Movements and Motor Activity: no abnormal involuntary movements noted  Orientation: oriented to person, place, time, and situation  Speech: normal rate, normal rhythm, normal volume, normal tone  Mood: "All right"  Affect: mood-congruent  Thought Process: linear  Associations: intact  Thought Content and Perceptions: delusions improving, no suicidal ideation, no homicidal ideation  Recent and Remote Memory: recent memory intact, remote memory intact; per interview/observation with patient  Attention and Concentration: intact, attentive to conversation; per interview/observation with patient  Fund of Knowledge: intact, aware of current events, vocabulary appropriate; based on history, vocabulary, fund of knowledge, syntax, grammar, and content  Insight: questionable; based on understanding of severity of illness and HPI  Judgment: questionable; based on patient's behavior and " HPI      ASSESSMENT/PLAN:   Problems Addressed/Diagnoses:  Schizoaffective Disorder, bipolar type (F25.0)  Amphetamine use disorder (F15.20)  Cannabis use disorder (F12.20)    Past Medical History:   Diagnosis Date    Addiction to drug     Bipolar disorder     History of psychiatric hospitalization     Psychiatric exam requested by authority     Schizoaffective disorder     Substance abuse         Plan:  Schizoaffective Disorder  -Continue Abilify  -Discontinue Seroquel    Amphetamine use  -Group/Individual psychotherapy    Cannabis use  -Group/Individual psychotherapy     Expected Disposition Plan: Home        Tomás Schultz M.D.

## 2023-05-15 NOTE — NURSING
"Daily Nursing Note:      Behavior:    Patient (Gerardo Lopez is a 52 y.o. male, : 1970, MRN: [id number]) demonstrating an affect that was anxious. Gerardo demonstrating mood that is anxious and pleasant and appropriate. Gerardo had an appearance that was disheveled. Gerardo denies suicidal ideation. Gerardo denies suicide plan. Gerardo denies homicidal ideation. Gerardo denies hallucinations.    Gerardo's  height is 5' 6" (1.676 m) and weight is 70.3 kg (155 lb). His temperature is 99.1 °F (37.3 °C). His blood pressure is 130/87 and his pulse is 88. His respiration is 18 and oxygen saturation is 97%.     Cars last BM was noted on: 23      Intervention:    Encourage Gerardo to perform self-hygiene, grooming, and changing of clothing. Monitor Gerardo's behavior and program compliance. Monitor Gerardo for suicidal ideation, homicidal ideation, sleep disturbance, and hallucinations. Encourage Gerardo to eat all portions of meals and assess for meal preferences. Monitor Gerardo for intake and output to ensure hydration. Notify the Physician/Physician Assistant/Advance Practice Registered Nurse (MD/PA/APRN) for any medication refusal and any change in patient condition.      Response:    Gerardo verbalizes understand of unit process and procedures. Gerardo reported medications are effective      Plan:     Continue to monitor per MD/PA/APRN orders; maintain patient safety.   "

## 2023-05-15 NOTE — NURSING
"PRN Medication Follow-up Note:    Behavior:    Patient (Gerardo Lopez is a 52 y.o. male, : 1970, MRN: [id number])     Allergies: Patient has no known allergies.    Gerardo's  height is 5' 6" (1.676 m) and weight is 70.3 kg (155 lb). His temperature is 99.1 °F (37.3 °C). His blood pressure is 130/87 and his pulse is 88. His respiration is 18 and oxygen saturation is 97%.     Administered __guifenesen___20ml__ per physician order to Gerardo       Intervention:    Intervention to Gerardo's response: tolerated well_.       Response:    Gerardo's response: _effective________      Plan:     Continue to monitor per MD/PA/APRN orders; and reevaluate medication effectiveness within 30 minutes.     "

## 2023-05-15 NOTE — GROUP NOTE
Group Psychotherapy       Group Focus: Disease Model of Addiction      Number of patients in attendance: 14    Group Start Time: 1430  Group End Time:  1500  Groups Date: 5/15/2023  Group Topic:  Behavioral Health  Group Department: [facility]  Group Facilitators:  Maday Aguiar RN  _____________________________________________________________________    Patient Name: Gerardo Lopez  MRN: [id number]  Patient Class: IP- Psych   Admission Date\Time: 5/11/2023 12:31 AM  Hospital Length of Stay: 4  Primary Care Provider: Primary Doctor No     Referred by: Acute Psychiatry Unit Treatment Team     Target symptoms: Substance Abuse     Patient's response to treatment: not present     Progress toward goals: Not progressing          Diagnosis: Psychosis     Plan: Continue treatment on [facility]

## 2023-05-15 NOTE — GROUP NOTE
Group Psychotherapy       Group Focus: Promoting Healthy Lifestyles and Life Skills   Self-reflect activity: Patient will be able to identify positive and negative character traits. Patient, peers, and therapist will engage in a healthy conversation surrounding the topic self-reflection. Patient will be able to give and receive positive feedback from peers and therapist. Patient will be able to positively and effectively interact and engage in group session.   Number of patients in attendance: 9    Group Start Time: 1000  Group End Time:  1045  Groups Date: 5/15/2023  Group Topic:  Behavioral Health  Group Department: [facility]  Group Facilitators:  Monae Wang  _____________________________________________________________________    Patient Name: Gerardo Lopez  MRN: [id number]  Patient Class: IP- Psych   Admission Date\Time: 5/11/2023 12:31 AM  Hospital Length of Stay: 4  Primary Care Provider: Primary Doctor No     Referred by:      Target symptoms: Psychosis     Patient's response to treatment: Not Participating; Pt did not attend group session. Therapist will provide alternate activity.        Progress toward goals: Not progressing     Interval History:      Diagnosis:      Plan: Therapist will continue to encourage patient to attend daily group sessions. Patient will engage adequately and effectively in group setting.

## 2023-05-15 NOTE — NURSING
"PRN Administration Note:    Behavior:    Patient (Gerardo Lopez is a 52 y.o. male, : 1970, MRN: [id number])     Allergies: Patient has no known allergies.    Gerardo's  height is 5' 6" (1.676 m) and weight is 70.3 kg (155 lb). His temperature is 99.1 °F (37.3 °C). His blood pressure is 130/87 and his pulse is 88. His respiration is 18 and oxygen saturation is 97%.     Reason for PRN Administration: insomnia_____.    Intervention:    Administered ___trazodone 100mg po____ per physician order to Gerardo       Response:    Gerardo tolerated administration well.      Plan:     Continue to monitor per MD/PA/APRN orders; and reevaluate medication effectiveness within 30 minutes.   "

## 2023-05-15 NOTE — GROUP NOTE
Group Psychotherapy       Group Focus: Life Skills   Group topic: Communication Skills: Therapist explored patients need for increasing communication skills through assertiveness or active listening. Patient was able to discuss examples of bad communication and methods to improve communication in their own lives. Patient continues to make progress towards treatment goals.        Number of patients in attendance: 8    Group Start Time: 1045  Group End Time:  1130  Groups Date: 5/15/2023  Group Topic:  Behavioral Health  Group Department: [facility] - [facility]  Group Facilitators:  MARYELLEN Rowan  _____________________________________________________________________    Patient Name: Gerardo Lopez  MRN: [id number]  Patient Class: IP- Psych   Admission Date\Time: 5/11/2023 12:31 AM  Hospital Length of Stay: 4  Primary Care Provider: Primary Doctor No     Referred by: Acute Psychiatry Unit Treatment Team     Target symptoms: Depression     Patient's response to treatment: Not Participating     Progress toward goals: Not progressing     Interval History:      Diagnosis:      Plan: Continue treatment on [facility]

## 2023-05-16 PROCEDURE — 12400001 HC PSYCH SEMI-PRIVATE ROOM

## 2023-05-16 PROCEDURE — 63700000 PHARM REV CODE 250 ALT 637 W/O HCPCS: Performed by: FAMILY MEDICINE

## 2023-05-16 PROCEDURE — 25000003 PHARM REV CODE 250

## 2023-05-16 RX ADMIN — ARIPIPRAZOLE 30 MG: 5 TABLET ORAL at 09:05

## 2023-05-16 RX ADMIN — AZITHROMYCIN MONOHYDRATE 250 MG: 250 TABLET ORAL at 09:05

## 2023-05-16 NOTE — NURSING
Pt is currently voicing no ADRs or physical complaints at this time, vital signs are stable, pt is not in any physical distress at the current time, currently voicing no suicidal or homicidal ideations at this time, currently voicing no hallucinations or delusions at this time, currently voicing no detox symptoms at this time, pt was seen earlier today by YANG Ramsey, Serkell was dc'd yest, pt will be monitored for anger, psychosis and detox symptoms and   Will be assisted prn.

## 2023-05-16 NOTE — NURSING
"Daily Nursing Note:      Behavior:    Patient (Gerardo Lopez is a 52 y.o. male, : 1970, MRN: [id number]) demonstrating an affect that was blunted. Gerardo demonstrating mood that is anxious. Gerardo had an appearance that was clean. Gerardo denies suicidal ideation. Gerardo denies suicide plan. Gerardo denies homicidal ideation. Gerardo denies hallucinations. No apparent delusions.    Gerardo's  height is 5' 6" (1.676 m) and weight is 70.3 kg (155 lb). His temperature is 98.2 °F (36.8 °C). His blood pressure is 131/82 and his pulse is 79. His respiration is 18 and oxygen saturation is 97%.     Gerardo's last BM was noted on: 05/15/2023.      Intervention:    Encourage Gerardo to perform self-hygiene, grooming, and changing of clothing. Monitor Gerardo's behavior and program compliance. Monitor Gerardo for suicidal ideation, homicidal ideation, sleep disturbance, and hallucinations. Encourage Gerardo to eat all portions of meals and assess for meal preferences. Monitor Gerardo for intake and output to ensure hydration. Notify the Physician/Physician Assistant/Advance Practice Registered Nurse (MD/PA/APRN) for any medication refusal and any change in patient condition.      Response:    Gerardo verbalizes understand of unit process and procedures. Gerardo reported medications are helping to decrease his symptoms of psychosis, anxiety and depression.      Plan:     Continue to monitor per MD/PA/APRN orders; maintain patient safety.   "

## 2023-05-16 NOTE — GROUP NOTE
Group Psychotherapy       Group Focus: Stress Management   Group topic: Personal Boundaries: Therapist explored patients need for safe boundaries. Patient was able to discuss what boundaries are and ways to enforce boundaries. Patient continues to make progress towards treatment goals.      Number of patients in attendance: 6    Group Start Time: 1045  Group End Time:  1130  Groups Date: 5/16/2023  Group Topic:  Behavioral Health  Group [facility]  Group Facilitators:  Elizabeth Santiago  _____________________________________________________________________    Patient Name: Gerardo Lopez  MRN: [id number]  Patient Class: IP- Psych   Admission Date\Time: 5/11/2023 12:31 AM  Hospital Length of Stay: 5  Primary Care Provider: Primary Doctor No     Referred by: Acute Psychiatry Unit Treatment Team     Target symptoms: Substance Abuse     Patient's response to treatment: Active Listening     Progress toward goals: Progressing adequately     Interval History:      Diagnosis:      Plan: Continue treatment on AP[facility]

## 2023-05-16 NOTE — GROUP NOTE
Group Psychotherapy       Group Focus: Leisure Exploration   Leisure Exploration: Patient will be provided with several leisure activities to choose from. Patient will be able to attend the group session with an appropriate and positive outlook on life and the group session. Patient will be able to give and receive positive feedback to and from peers and therapist.    Number of patients in attendance: 9    Group Start Time: 1000  Group End Time:  1045  Groups Date: 5/16/2023  Group Topic:  Behavioral Health  Group Department: [facility]  Group Facilitators:  Monae Wang  _____________________________________________________________________    Patient Name: Gerardo Lopez  MRN: [id number]  Patient Class: IP- Psych   Admission Date\Time: 5/11/2023 12:31 AM  Hospital Length of Stay: 5  Primary Care Provider: Primary Doctor No     Referred by:      Target symptoms: Psychosis     Patient's response to treatment: Active Listening and Feedback given to another patient     Progress toward goals: Progressing adequately     Interval History:      Diagnosis:      Plan: Pt will continue to attend daily group sessions and focus on improving overall health, thought process, behavior, and mood.

## 2023-05-16 NOTE — GROUP NOTE
Group Psychotherapy       Group Focus: Spirituality and Well-Being and Promoting Healthy Lifestyles    Identifying Emotions Activity: Patient will be given an identifying emotions activity by the therapist. Patient will be able to effectively and independently identify the emotion assigned to them. Patient will be able to identify their behaviors during this emotion, their avoidances, their likes and dislikes, etc. Patient will effectively and positively engage in group discussion with peers and therapist whilst giving and receiving positive feedback.     Number of patients in attendance: 9    Group Start Time: 1500  Group End Time:  1600  Groups Date: 5/16/2023  Group Topic:  Behavioral Health  Group Department: [facility]  Group Facilitators:  Monae Wang  _____________________________________________________________________    Patient Name: Gerardo Lopez  MRN: [id number]  Patient Class: IP- Psych   Admission Date\Time: 5/11/2023 12:31 AM  Hospital Length of Stay: 5  Primary Care Provider: Primary Doctor No     Referred by:      Target symptoms: Psychosis     Patient's response to treatment: Not Participating; Pt did not attend group session. Therapist will provide alternate activity.        Progress toward goals: Not progressing     Interval History:      Diagnosis:      Plan: Therapist will continue to encourage patient to attend daily group sessions. Patient will engage adequately and effectively in group setting.

## 2023-05-16 NOTE — GROUP NOTE
Group Psychotherapy       Group Focus: Psychiatric Medication Education      Number of patients in attendance: 15    Group Start Time: 2030  Group End Time:  2100  Groups Date: 5/15/2023  Group Topic:  Behavioral Health  Group Department: [facility]  Group Facilitators:  Jayda Pearce RN  _____________________________________________________________________    Patient Name: Gerardo Lopez  MRN: [id number]  Patient Class: IP- Psych   Admission Date\Time: 5/11/2023 12:31 AM  Hospital Length of Stay: 4  Primary Care Provider: Primary Doctor No     Referred by: Acute Psychiatry Unit Treatment Team     Target symptoms: Depression and Anxiety     Patient's response to treatment: Not Participating     Progress toward goals: Progressing slowly     Diagnosis: Unspecified Psychosis     Plan: Continue treatment on [facility]

## 2023-05-16 NOTE — PROGRESS NOTES
"5/16/2023  Gerardo Lopez   1970   [id number]        Psychiatry Progress Note     Chief Complaint: "I've been cleared of schizophrenia"    SUBJECTIVE:   Gerardo Lopez is a 52 y.o. male placed under a PEC at [facility] by OPC from mother after apparently being non compliant with medications and having an exacerbation of symptoms (believed that his mother was trying to poison him).    Today patient states that he is feeling much better. He states last night and today is the best he has felt and he believes it is because the Seroquel has been discontinued. Patient denies any suicidal or homicidal thoughts. He denies any AVH. He remains slightly elevated and grandiose but has shown positive improvements. Tolerating the Abilify without issues. Will continue with current POC.       UDS: (+)amphetamine, cannabinoids  Blood alcohol: <10     Current Medications:   Scheduled Meds:    ARIPiprazole  30 mg Oral Daily    nicotine  1 patch Transdermal Daily      PRN Meds: acetaminophen, aluminum-magnesium hydroxide-simethicone, benzonatate, dextromethorphan-guaiFENesin  mg/5 ml, haloperidoL **AND** diphenhydrAMINE **AND** LORazepam **AND** haloperidol lactate **AND** diphenhydrAMINE **AND** lorazepam, hydrOXYzine HCL, ondansetron, promethazine   Psychotherapeutics (From admission, onward)      Start     Stop Route Frequency Ordered    05/11/23 1045  ARIPiprazole tablet 30 mg         -- Oral Daily 05/11/23 0935    05/11/23 0100  haloperidoL tablet 10 mg  (Med - Acute  Behavioral Management)        See Hyperspace for full Linked Orders Report.    -- Oral Every 4 hours PRN 05/11/23 0100    05/11/23 0100  LORazepam tablet 2 mg  (Med - Acute  Behavioral Management)        See Hyperspace for full Linked Orders Report.    -- Oral Every 4 hours PRN 05/11/23 0100    05/11/23 0100  haloperidol lactate injection 10 mg  (Med - Acute  Behavioral Management)        See Hyperspace for full Linked Orders Report.    -- IM Every " "4 hours PRN 05/11/23 0100    05/11/23 0100  LORazepam injection 2 mg  (Med - Acute  Behavioral Management)        See Hyperspace for full Linked Orders Report.    -- IM Every 4 hours PRN 05/11/23 0100            Allergies:   Review of patient's allergies indicates:  No Known Allergies     OBJECTIVE:   Vitals   Vitals:    05/16/23 0700   BP: 115/76   Pulse: 83   Resp: 18   Temp: 98.2 °F (36.8 °C)        Labs/Imaging/Studies:   No results found for this or any previous visit (from the past 36 hour(s)).       Medical Review Of Systems:  Constitutional: negative  Respiratory: negative  Cardiovascular: negative  Gastrointestinal: negative  Genitourinary:negative  Musculoskeletal:negative  Neurological: negative      Psychiatric Mental Status Exam:  General Appearance: appears stated age, well-developed, well-nourished  Arousal: alert  Behavior: cooperative  Movements and Motor Activity: no abnormal involuntary movements noted  Orientation: oriented to person, place, time, and situation  Speech: normal rate, normal rhythm, normal volume, normal tone  Mood: "All right"  Affect: mood-congruent  Thought Process: linear  Associations: intact  Thought Content and Perceptions: delusions improving, no suicidal ideation, no homicidal ideation  Recent and Remote Memory: recent memory intact, remote memory intact; per interview/observation with patient  Attention and Concentration: intact, attentive to conversation; per interview/observation with patient  Fund of Knowledge: intact, aware of current events, vocabulary appropriate; based on history, vocabulary, fund of knowledge, syntax, grammar, and content  Insight: questionable; based on understanding of severity of illness and HPI  Judgment: questionable; based on patient's behavior and HPI      ASSESSMENT/PLAN:   Problems Addressed/Diagnoses:  Schizoaffective Disorder, bipolar type (F25.0)  Amphetamine use disorder (F15.20)  Cannabis use disorder (F12.20)    Past Medical " History:   Diagnosis Date    Addiction to drug     Bipolar disorder     History of psychiatric hospitalization     Psychiatric exam requested by authority     Schizoaffective disorder     Substance abuse         Plan:  Schizoaffective Disorder  -Continue Abilify      Amphetamine use  -Group/Individual psychotherapy    Cannabis use  -Group/Individual psychotherapy     Expected Disposition Plan: Home        Azar Huang, [id number]

## 2023-05-17 VITALS
OXYGEN SATURATION: 98 % | SYSTOLIC BLOOD PRESSURE: 138 MMHG | BODY MASS INDEX: 24.91 KG/M2 | HEART RATE: 79 BPM | HEIGHT: 66 IN | RESPIRATION RATE: 18 BRPM | TEMPERATURE: 98 F | DIASTOLIC BLOOD PRESSURE: 84 MMHG | WEIGHT: 155 LBS

## 2023-05-17 PROBLEM — F15.20 METHAMPHETAMINE ADDICTION: Status: ACTIVE | Noted: 2023-05-17

## 2023-05-17 PROBLEM — F12.20 CANNABIS DEPENDENCE, CONTINUOUS: Status: ACTIVE | Noted: 2023-05-17

## 2023-05-17 PROBLEM — F25.0 SCHIZOAFFECTIVE DISORDER, BIPOLAR TYPE: Status: ACTIVE | Noted: 2023-05-17

## 2023-05-17 PROCEDURE — 12400001 HC PSYCH SEMI-PRIVATE ROOM

## 2023-05-17 PROCEDURE — 25000003 PHARM REV CODE 250

## 2023-05-17 RX ORDER — ARIPIPRAZOLE 30 MG/1
30 TABLET ORAL DAILY
Qty: 30 TABLET | Refills: 0 | Status: SHIPPED | OUTPATIENT
Start: 2023-05-18 | End: 2023-06-05 | Stop reason: SDUPTHER

## 2023-05-17 RX ADMIN — ARIPIPRAZOLE 30 MG: 5 TABLET ORAL at 08:05

## 2023-05-17 NOTE — NURSING
Pt is currently voicing no ADRs or physical complaints at this time, vital signs are stable,pt is not in any physical distress at this time, currently voicing no suicidal or homicidal ideations at this time, voicing no hallucinations or delusions at this time, voicing no detox symptoms at this time, pt is compliant with medication ordered, pt was seen earlier today by Dr Schultz, pt is planned for discharge tomorrow, pt will be monitored with suicide prec., for anger, psychosis and for detox and will be assisted prn.

## 2023-05-17 NOTE — PROGRESS NOTES
"5/17/2023  Gerardo Lopez   1970   [id number]        Psychiatry Progress Note     Chief Complaint: "I've been cleared of schizophrenia"    SUBJECTIVE:   Gerardo Lopez is a 52 y.o. male placed under a PEC at [facility] by OPC from mother after apparently being non compliant with medications and having an exacerbation of symptoms (believed that his mother was trying to poison him).    Staff states that he attends groups.  Will return home with his father and will f/u with [facility].  Reports cough, but no other issues.  Tolerating his current medication regimen.  Denies current delusions or hallucinations.  Will proceed with discharge tomorrow.      UDS: (+)amphetamine, cannabinoids  Blood alcohol: <10     Current Medications:   Scheduled Meds:    ARIPiprazole  30 mg Oral Daily    nicotine  1 patch Transdermal Daily      PRN Meds: acetaminophen, aluminum-magnesium hydroxide-simethicone, benzonatate, dextromethorphan-guaiFENesin  mg/5 ml, haloperidoL **AND** diphenhydrAMINE **AND** LORazepam **AND** haloperidol lactate **AND** diphenhydrAMINE **AND** lorazepam, hydrOXYzine HCL, ondansetron, promethazine   Psychotherapeutics (From admission, onward)      Start     Stop Route Frequency Ordered    05/11/23 1045  ARIPiprazole tablet 30 mg         -- Oral Daily 05/11/23 0935    05/11/23 0100  haloperidoL tablet 10 mg  (Med - Acute  Behavioral Management)        See Hyperspace for full Linked Orders Report.    -- Oral Every 4 hours PRN 05/11/23 0100    05/11/23 0100  LORazepam tablet 2 mg  (Med - Acute  Behavioral Management)        See Hyperspace for full Linked Orders Report.    -- Oral Every 4 hours PRN 05/11/23 0100    05/11/23 0100  haloperidol lactate injection 10 mg  (Med - Acute  Behavioral Management)        See Hyperspace for full Linked Orders Report.    -- IM Every 4 hours PRN 05/11/23 0100    05/11/23 0100  LORazepam injection 2 mg  (Med - Acute  Behavioral Management)        See " "Hyperspace for full Linked Orders Report.    -- IM Every 4 hours PRN 05/11/23 0100            Allergies:   Review of patient's allergies indicates:  No Known Allergies     OBJECTIVE:   Vitals   Vitals:    05/17/23 0701   BP: 138/84   Pulse: 79   Resp: 18   Temp: 98.2 °F (36.8 °C)        Labs/Imaging/Studies:   No results found for this or any previous visit (from the past 36 hour(s)).       Medical Review Of Systems:  Constitutional: negative  Respiratory: cough  Cardiovascular: negative  Gastrointestinal: negative  Genitourinary:negative  Musculoskeletal:negative  Neurological: negative      Psychiatric Mental Status Exam:  General Appearance: appears stated age, well-developed, well-nourished  Arousal: alert  Behavior: cooperative  Movements and Motor Activity: no abnormal involuntary movements noted  Orientation: oriented to person, place, time, and situation  Speech: normal rate, normal rhythm, normal volume, normal tone  Mood: "Good"  Affect: mood-congruent  Thought Process: linear  Associations: intact  Thought Content and Perceptions: no suicidal ideation, no homicidal ideation, no auditory hallucinations, no visual hallucinations, no paranoid ideation, no ideas of reference  Recent and Remote Memory: recent memory intact, remote memory intact; per interview/observation with patient  Attention and Concentration: intact, attentive to conversation; per interview/observation with patient  Fund of Knowledge: intact, aware of current events, vocabulary appropriate; based on history, vocabulary, fund of knowledge, syntax, grammar, and content  Insight: intact; based on understanding of severity of illness and HPI  Judgment: questionable; based on patient's behavior and HPI        ASSESSMENT/PLAN:   Problems Addressed/Diagnoses:  Schizoaffective Disorder, bipolar type (F25.0)  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

## 2023-05-17 NOTE — GROUP NOTE
Group Psychotherapy       Group Focus: Communication Skills      Number of patients in attendance: 9    Group Start Time: 1400  Group End Time:  1445  Groups Date: 5/17/2023  Group Topic:  Behavioral Health  Group Department: [facility]  Group Facilitators:  Elizabeth Santiago  _____________________________________________________________________    Patient Name: Gerardo Lopez  MRN: [id number]  Patient Class: IP- Psych   Admission Date\Time: 5/11/2023 12:31 AM  Hospital Length of Stay: 6  Primary Care Provider: Primary Doctor No     Referred by: Acute Psychiatry Unit Treatment Team     Target symptoms: Substance Abuse     Patient's response to treatment: Active Listening     Progress toward goals: Progressing adequately     Interval History:      Diagnosis:      Plan: Continue treatment on [facility]

## 2023-05-17 NOTE — GROUP NOTE
Group Psychotherapy       Group Focus: Communication Skills and Relationship Dynamics   Leisure Exploration: Patient will be provided with several leisure activities to choose from. Patient will be able to attend the group session with an appropriate and positive outlook on life and the group session. Patient will be able to give and receive positive feedback to and from peers and therapist.    Number of patients in attendance: 11    Group Start Time: 1500  Group End Time:  1600  Groups Date: 5/17/2023  Group Topic:  Behavioral Health  [facility]  Group Facilitators:  Monae Wang  _____________________________________________________________________    Patient Name: Gerardo Lopez  MRN: [id number]  Patient Class: IP- Psych   Admission Date\Time: 5/11/2023 12:31 AM  Hospital Length of Stay: 6  Primary Care Provider: Primary Doctor No     Referred by:      Target symptoms: Psychosis     Patient's response to treatment: Not Participating; Pt did not attend group session. Therapist will provide alternate activity.        Progress toward goals: Not progressing     Interval History:      Diagnosis:      Plan: Therapist will continue to encourage patient to attend daily group sessions. Patient will engage adequately and effectively in group setting.

## 2023-05-17 NOTE — NURSING
Treatment Team Note:      Behavior:    Patient (Gerardo Lopez is a 52 y.o. male, : 1970, MRN: [id number]) demonstrating an affect that was congruent. Gerardo demonstrating mood that is anxious. Gerardo had an appearance that was disheveled. Gerardo denies suicidal ideation. Gerardo denies suicide plan. Gerardo denies hallucinations.      Intervention:    Encourage Gerardo to perform self-hygiene, grooming, and changing of clothing. Encourage Gerardo to attend all scheduled groups. Monitor Gerardo's behavior and program compliance. Monitor Gerardo for suicidal ideation, homicidal ideation, sleep disturbance, and hallucinations. Encourage Gerardo to eat all portions of meals and assess for meal preferences. Monitor Gerardo for intake and output to ensure hydration. Notify the Physician/Physician Assistant/Advance Practice Registered Nurse (MD/PA/APRN) for any medication refusal and any change in patient condition.    Discussed with Gerardo course of treatment. Discussed with Gerardo medications ordered and schedule of medications. Discussed collateral contact with Gerardo.      Response:    Gerardo's response to treatment team meeting states he is alright and is less talkative to day.     Plan: following up with [facility]. Will be going home to live with his father.    Continue to monitor per MD/PA/APRN orders; maintain patient safety.

## 2023-05-17 NOTE — CARE UPDATE
Treatment Team:  Pt appeared to be calm and cooperative. Pt stated that he has been doing fine for the past few days.     Plan:  Therapist will encourage patient to promote a healthy lifestyle and thought process. Therapist will continue to encourage patient to participate in daily group sessions. Patient will continue to attend daily group sessions, while focusing on maintaining a positive and healthy lifestyle. Pt will be receiving after care at [facility].

## 2023-05-17 NOTE — CARE UPDATE
Treatment Team    Pt seem for treatment team today with interdisciplinary team.  Pt is Cooperative with Tx team. Pt denies symptoms at this time. MD did not change pt meds at this time. Treatment teams goals Met at this time. Pt DC plan is home. DC date scheduled for 5/18/23.

## 2023-05-17 NOTE — NURSING
"Daily Nursing Note:      Behavior:    Patient (Gerardo Lopez is a 52 y.o. male, : 1970, MRN: [id number]) demonstrating an affect that was congruent. Gerardo demonstrating mood that is depressed. Gerardo had an appearance that was clean. Gerardo denies suicidal ideation. Gerardo denies suicide plan. Gerardo denies homicidal ideation. Gerardo denies hallucinations.    Gerardo's  height is 5' 6" (1.676 m) and weight is 70.3 kg (155 lb). His temperature is 98.2 °F (36.8 °C). His blood pressure is 115/76 and his pulse is 83. His respiration is 18 and oxygen saturation is 90% (abnormal).     Cars last BM was noted on: 23  Intervention:    Encourage Gerardo to perform self-hygiene, grooming, and changing of clothing. Monitor Gerardo's behavior and program compliance. Monitor Gerardo for suicidal ideation, homicidal ideation, sleep disturbance, and hallucinations. Encourage Gerardo to eat all portions of meals and assess for meal preferences. Monitor Gerardo for intake and output to ensure hydration. Notify the Physician/Physician Assistant/Advance Practice Registered Nurse (MD/PA/APRN) for any medication refusal and any change in patient condition.      Response:    Gerardo verbalizes understand of unit process and procedures. Gerardo reported medications are helping      Plan:     Continue to monitor per MD/PA/APRN orders; maintain patient safety.   "

## 2023-05-18 PROCEDURE — 25000003 PHARM REV CODE 250

## 2023-05-18 RX ADMIN — ARIPIPRAZOLE 30 MG: 5 TABLET ORAL at 08:05

## 2023-05-18 NOTE — PROGRESS NOTES
"5/18/2023  Gerardo Lopez   1970   [id number]        Psychiatry Progress Note     Chief Complaint: "I've been cleared of schizophrenia"    SUBJECTIVE:   Gerardo Lopez is a 52 y.o. male placed under a PEC at [facility] by OPC from mother after apparently being non compliant with medications and having an exacerbation of symptoms (believed that his mother was trying to poison him).    Staff states that he attends groups.  Will return home with his father and will f/u with [facility].  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 Tolerating his current medication regimen.  Denies current delusions or hallucinations.  Will proceed with discharge today.      UDS: (+)amphetamine, cannabinoids  Blood alcohol: <10     Current Medications:   Scheduled Meds:    ARIPiprazole  30 mg Oral Daily    nicotine  1 patch Transdermal Daily      PRN Meds: acetaminophen, aluminum-magnesium hydroxide-simethicone, benzonatate, dextromethorphan-guaiFENesin  mg/5 ml, haloperidoL **AND** diphenhydrAMINE **AND** LORazepam **AND** haloperidol lactate **AND** diphenhydrAMINE **AND** lorazepam, hydrOXYzine HCL, ondansetron, promethazine   Psychotherapeutics (From admission, onward)      Start     Stop Route Frequency Ordered    05/11/23 1045  ARIPiprazole tablet 30 mg         -- Oral Daily 05/11/23 0935    05/11/23 0100  haloperidoL tablet 10 mg  (Med - Acute  Behavioral Management)        See Hyperspace for full Linked Orders Report.    -- Oral Every 4 hours PRN 05/11/23 0100    05/11/23 0100  LORazepam tablet 2 mg  (Med - Acute  Behavioral Management)        See Hyperspace for full Linked Orders Report.    -- Oral Every 4 hours PRN 05/11/23 0100    05/11/23 0100  haloperidol lactate injection 10 mg  (Med - Acute  Behavioral Management)        See Hyperspace for full Linked Orders Report.    -- IM Every 4 hours PRN 05/11/23 0100    05/11/23 0100  LORazepam injection 2 mg  (Med - Acute  Behavioral Management)     " "   See Solce for full Linked Orders Report.    -- IM Every 4 hours PRN 05/11/23 0100            Allergies:   Review of patient's allergies indicates:  No Known Allergies     OBJECTIVE:   Vitals   Vitals:    05/17/23 0701   BP: 138/84   Pulse: 79   Resp: 18   Temp: 98.2 °F (36.8 °C)        Labs/Imaging/Studies:   No results found for this or any previous visit (from the past 36 hour(s)).       Medical Review Of Systems:  Constitutional: negative  Respiratory: cough  Cardiovascular: negative  Gastrointestinal: negative  Genitourinary:negative  Musculoskeletal:negative  Neurological: negative      Psychiatric Mental Status Exam:  General Appearance: appears stated age, well-developed, well-nourished  Arousal: alert  Behavior: cooperative  Movements and Motor Activity: no abnormal involuntary movements noted  Orientation: oriented to person, place, time, and situation  Speech: normal rate, normal rhythm, normal volume, normal tone  Mood: "Good"  Affect: mood-congruent  Thought Process: linear  Associations: intact  Thought Content and Perceptions: no suicidal ideation, no homicidal ideation, no auditory hallucinations, no visual hallucinations, no paranoid ideation, no ideas of reference  Recent and Remote Memory: recent memory intact, remote memory intact; per interview/observation with patient  Attention and Concentration: intact, attentive to conversation; per interview/observation with patient  Fund of Knowledge: intact, aware of current events, vocabulary appropriate; based on history, vocabulary, fund of knowledge, syntax, grammar, and content  Insight: intact; based on understanding of severity of illness and HPI  Judgment: questionable; based on patient's behavior and HPI        ASSESSMENT/PLAN:   Problems Addressed/Diagnoses:  Schizoaffective Disorder, bipolar type (F25.0)  Amphetamine use disorder (F15.20)  Cannabis use disorder (F12.20)    Past Medical History:   Diagnosis Date    Addiction to drug     " Bipolar disorder     History of psychiatric hospitalization     Psychiatric exam requested by authority     Schizoaffective disorder     Substance abuse         Plan:  Schizoaffective Disorder  -Continue Abilify    Amphetamine use  -Group/Individual psychotherapy    Cannabis use  -Group/Individual psychotherapy     -Proceed with discharge    Expected Disposition Plan: Home        Azar Huang, GRAHAMP-BC

## 2023-05-18 NOTE — NURSING
"Daily Nursing Note:      Behavior:    Patient (Gerardo Lopez is a 52 y.o. male, : 1970, MRN: [id number]) demonstrating an affect that was anxious. Gerardo demonstrating mood that is anxious. Gerardo had an appearance that was clean. Gerardo denies suicidal ideation. Gerardo denies suicide plan. Gerardo denies homicidal ideation. Gerardo denies hallucinations.    Gerardo's  height is 5' 6" (1.676 m) and weight is 70.3 kg (155 lb). His temperature is 98.2 °F (36.8 °C). His blood pressure is 138/84 and his pulse is 79. His respiration is 18 and oxygen saturation is 98%.     Gerardo's last BM was noted on: _______      Intervention:    Encourage Gerardo to perform self-hygiene, grooming, and changing of clothing. Monitor Gerardo's behavior and program compliance. Monitor Gerardo for suicidal ideation, homicidal ideation, sleep disturbance, and hallucinations. Encourage Gerardo to eat all portions of meals and assess for meal preferences. Monitor Gerardo for intake and output to ensure hydration. Notify the Physician/Physician Assistant/Advance Practice Registered Nurse (MD/PA/APRN) for any medication refusal and any change in patient condition.      Response:    Gerardo verbalizes understand of unit process and procedures. Gerardo reported medications ______.      Plan:     Continue to monitor per MD/PA/APRN orders; maintain patient safety.   "

## 2023-05-18 NOTE — PLAN OF CARE
Problem: Behavior Regulation Impairment (Excessive Substance Use)  Goal: Improved Behavioral Control (Excessive Substance Use)  Outcome: Met     Problem: Decreased Participation and Engagement (Excessive Substance Use)  Goal: Increased Participation and Engagement (Excessive Substance Use)  Outcome: Met     Problem: Decreased Participation and Engagement (Psychotic Signs/Symptoms)  Goal: Increased Participation and Engagement (Psychotic Signs/Symptoms)  Outcome: Met     Problem: Mood Impairment (Psychotic Signs/Symptoms)  Goal: Improved Mood Symptoms (Psychotic Signs/Symptoms)  Outcome: Met

## 2023-05-19 NOTE — DISCHARGE SUMMARY
DISCHARGE SUMMARY  PSYCHIATRY      Admit Date: 5/11/2023 12:31 AM    Discharge Date:  5/18/2023    SITE:   [facility] *  [facility]    Discharge Attending Physician: Tomás Schultz M.D.    Chief Complaint:  I've been cleared of schizophrenia    History of Present Illness On Admit:   Gerardo Lopez is a 52 y.o. male placed under a PEC at [facility] by OPC from mother after apparently being non compliant with medications and having an exacerbation of symptoms. Patient states that he has bee told by his provider at [facility] that he can stop taking his medications and start using medical marijuana for his symptoms because he does not have Bipolar disorder or Schizophrenia. Patient states that he has been cleared of schizophrenia by the government. He is focused on getting off of his medications because he does not believe he needs them. I discussed with him further about his illness and he is noncompliant with this. I discussed with him about possibly changing his medications and he is not amenable to this.  He would like us to contact Dr Orellana to discuss this. We will need collateral information from both provider and mother. I will decrease the Seroquel to 100 mg at this time and continue Abilify at 30 mg.     UDS: (+)amphetamine, cannabinoids  Blood alcohol: <10      Admit Mental Status Exam:  General Appearance: well-nourished, normal weight, dressed in hospital garb, appears older than stated age, poorly groomed, disheveled  Arousal: alert  Behavior: cooperative, appropriate eye-contact, restless and fidgety, Defiant but somewhat redirectable  Movements and Motor Activity: no abnormal involuntary movements noted; no tics, no tremors, no akathisia, no dystonia, no evidence of tardive dyskinesia; no psychomotor agitation or retardation  Orientation: intact; oriented fully to person, place, time and situation  Speech: normal volume, conversational, spontaneous,  interruptible, pressured, refuses to speak on topics that go against his beliefs or feelings  Mood: Manic, Irritable, and Dysphoric  Affect: dysphoric, irritable, expansive, bizarre  Thought Process: goal-directed, organized,but illogical  Associations: intact, no loosening of associations  Thought Content and Perceptions: no suicidal or homicidal ideation, no auditory or visual hallucinations, probable paranoid ideation, no ideas of reference, (+) evidence of delusions or psychosis  Recent and Remote Memory: intact; per interview/observation with patient  Attention and Concentration: intact; per interview/observation with patient  Fund of Knowledge: intact; based on history, vocabulary, fund of knowledge, syntax, grammar, and content  Insight: impaired due to illness ; based on understanding of severity of illness and HPI  Judgment: impaired due to illness ; based on patient's behavior and HPI      Diagnoses:  PRINCIPAL PROBLEM:  Schizoaffective disorder, bipolar type      PROBLEM LIST    Schizoaffective disorder, bipolar type    Methamphetamine addiction    Cannabis dependence, continuous        Hospital Course:   Patient was admitted to [facility] and started on Abilify 30mg daily.  Home Seroquel was decreased to 100mg HS.    5/12/23  CM spoke with [facility] and was told that patient had not been seen there in over a year. She also spoke with mother who stated that he is a  and does not want to take his medications, however when he doesn't take them he begins to hallucinate more. They also stated that they have no recollection of telling him he could stop taking his medication. He is still focused on that today. He did state that the reduction of Seroquel was much more tolerable and he states that he slept better last night than he has in a while. He remains grandiose and elevated. I will continue with current POC as I do not believe patient was compliant with medications at home. We will monitor his progress over  the weekend and adjust his medications as needed.     5/15/23  He came in on Abilify and Seroquel.  Seroquel has been decreased and will discontinue today since he is on 2 antipsychotics.  He has also been improving since becoming more compliant with his medications.  No tremors, rigidity, extrapyramidal symptoms, or excessive sedation were noted.  Will have PRN trazodone while here for insomnia.  His PCP was continuing his medications temporarily but he will need aftercare set up for discharge.    5/16/23  Today patient states that he is feeling much better. He states last night and today is the best he has felt and he believes it is because the Seroquel has been discontinued. Patient denies any suicidal or homicidal thoughts. He denies any AVH. He remains slightly elevated and grandiose but has shown positive improvements. Tolerating the Abilify without issues. Will continue with current POC.     5/17/23  Staff states that he attends groups.  Will return home with his father and will f/u with [facility].  Reports cough, but no other issues.  Tolerating his current medication regimen.  Denies current delusions or hallucinations.  Will proceed with discharge tomorrow.    5/18/23  Staff states that he attends groups.  Will return home with his father and will f/u with [facility].  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 Tolerating his current medication regimen.  Denies current delusions or hallucinations.  Will proceed with discharge today.    During the program course he was educated on the dynamic aspects of his disorder.  Individual and group psychotherapy sessions focused on disease process, symptom management, positive coping skills, healthy living skills, leisure skills, and chemical dependency issues.  Nursing groups focused on medications and the importance of both medication and treatment compliance.  He achieved maximum benefit of inpatient hospitalization at this level of  "functioning and was discharged home.  At the time of discharge, no thoughts of self-harm or harm to others noted.  At the time of discharge no tremors, rigidity, extrapyramidal symptoms, or excessive sedation were noted.        DISCHARGE EXAMINATION    VITALS   Vitals:    05/14/23 1900 05/15/23 0701 05/16/23 0700 05/17/23 0701   BP: 130/87 131/82 115/76 138/84   Pulse: 88 79 83 79   Resp: 18 18 18 18   Temp: 99.1 °F (37.3 °C) 98.2 °F (36.8 °C) 98.2 °F (36.8 °C) 98.2 °F (36.8 °C)   TempSrc:       SpO2: 97%  (!) 90% 98%   Weight:       Height:             Discharge Mental Status Exam:  General Appearance: appears stated age, well-developed, well-nourished  Arousal: alert  Behavior: cooperative  Movements and Motor Activity: no abnormal involuntary movements noted  Orientation: oriented to person, place, time, and situation  Speech: normal rate, normal rhythm, normal volume, normal tone  Mood: "Good"  Affect: mood-congruent  Thought Process: linear  Associations: intact  Thought Content and Perceptions: no suicidal ideation, no homicidal ideation, no auditory hallucinations, no visual hallucinations, no paranoid ideation, no ideas of reference  Recent and Remote Memory: recent memory intact, remote memory intact; per interview/observation with patient  Attention and Concentration: intact, attentive to conversation; per interview/observation with patient  Fund of Knowledge: intact, aware of current events, vocabulary appropriate; based on history, vocabulary, fund of knowledge, syntax, grammar, and content  Insight: intact; based on understanding of severity of illness and HPI  Judgment: questionable; based on patient's behavior and HPI      Discharge Condition:  Stable    Prognosis:  Good based on condition at discharge    Justification for >1 antipsychotic:  N/a    Disposition:  discharged to home    Follow-up:  [facility]      Medication Regimen:  No current facility-administered medications for this " encounter.    Current Outpatient Medications:     ARIPiprazole (ABILIFY) 30 MG Tab, Take 1 tablet (30 mg total) by mouth once daily., Disp: 30 tablet, Rfl: 0      Patient Instructions:   Continue medication regimen as prescribed.    Disposition plan per  - see  notes for details.    Patient instructed to call 911 or present to emergency department if any of the following complications develop status post discharge: suicidality, homicidality, or grave disability.     Total time spent discharging patient: <30 minutes      Tomás Schultz M.D.

## 2023-06-01 ENCOUNTER — TELEPHONE (OUTPATIENT)
Dept: FAMILY MEDICINE | Facility: CLINIC | Age: 53
End: 2023-06-01
Payer: MEDICAID

## 2023-06-05 ENCOUNTER — OFFICE VISIT (OUTPATIENT)
Dept: FAMILY MEDICINE | Facility: CLINIC | Age: 53
End: 2023-06-05
Payer: MEDICAID

## 2023-06-05 VITALS
BODY MASS INDEX: 25.37 KG/M2 | DIASTOLIC BLOOD PRESSURE: 80 MMHG | SYSTOLIC BLOOD PRESSURE: 137 MMHG | HEART RATE: 73 BPM | HEIGHT: 66 IN | TEMPERATURE: 98 F | WEIGHT: 157.88 LBS | RESPIRATION RATE: 18 BRPM | OXYGEN SATURATION: 98 %

## 2023-06-05 DIAGNOSIS — Z12.5 ENCOUNTER FOR SCREENING FOR MALIGNANT NEOPLASM OF PROSTATE: ICD-10-CM

## 2023-06-05 DIAGNOSIS — F25.0 SCHIZOAFFECTIVE DISORDER, BIPOLAR TYPE: ICD-10-CM

## 2023-06-05 DIAGNOSIS — Z00.00 ENCOUNTER FOR WELLNESS EXAMINATION: Primary | ICD-10-CM

## 2023-06-05 PROCEDURE — 3079F PR MOST RECENT DIASTOLIC BLOOD PRESSURE 80-89 MM HG: ICD-10-PCS | Mod: CPTII,,,

## 2023-06-05 PROCEDURE — 99214 OFFICE O/P EST MOD 30 MIN: CPT | Mod: PBBFAC,PN

## 2023-06-05 PROCEDURE — 99386 PR PREVENTIVE VISIT,NEW,40-64: ICD-10-PCS | Mod: S$PBB,,,

## 2023-06-05 PROCEDURE — 3075F PR MOST RECENT SYSTOLIC BLOOD PRESS GE 130-139MM HG: ICD-10-PCS | Mod: CPTII,,,

## 2023-06-05 PROCEDURE — 1160F PR REVIEW ALL MEDS BY PRESCRIBER/CLIN PHARMACIST DOCUMENTED: ICD-10-PCS | Mod: CPTII,,,

## 2023-06-05 PROCEDURE — 3075F SYST BP GE 130 - 139MM HG: CPT | Mod: CPTII,,,

## 2023-06-05 PROCEDURE — 3079F DIAST BP 80-89 MM HG: CPT | Mod: CPTII,,,

## 2023-06-05 PROCEDURE — 1160F RVW MEDS BY RX/DR IN RCRD: CPT | Mod: CPTII,,,

## 2023-06-05 PROCEDURE — 1111F PR DISCHARGE MEDS RECONCILED W/ CURRENT OUTPATIENT MED LIST: ICD-10-PCS | Mod: CPTII,,,

## 2023-06-05 PROCEDURE — 3008F PR BODY MASS INDEX (BMI) DOCUMENTED: ICD-10-PCS | Mod: CPTII,,,

## 2023-06-05 PROCEDURE — 1111F DSCHRG MED/CURRENT MED MERGE: CPT | Mod: CPTII,,,

## 2023-06-05 PROCEDURE — 1159F PR MEDICATION LIST DOCUMENTED IN MEDICAL RECORD: ICD-10-PCS | Mod: CPTII,,,

## 2023-06-05 PROCEDURE — 1159F MED LIST DOCD IN RCRD: CPT | Mod: CPTII,,,

## 2023-06-05 PROCEDURE — 3008F BODY MASS INDEX DOCD: CPT | Mod: CPTII,,,

## 2023-06-05 PROCEDURE — 99386 PREV VISIT NEW AGE 40-64: CPT | Mod: S$PBB,,,

## 2023-06-05 RX ORDER — ARIPIPRAZOLE 30 MG/1
30 TABLET ORAL DAILY
Qty: 30 TABLET | Refills: 0 | Status: SHIPPED | OUTPATIENT
Start: 2023-06-05 | End: 2023-07-14 | Stop reason: SDUPTHER

## 2023-06-05 NOTE — PROGRESS NOTES
Patient Name: Gerardo Lopez   : 1970  MRN: [id number]     Subjective:   Patient ID: Gerardo Lopez is a 52 y.o. male.    Chief Complaint:   Chief Complaint   Patient presents with    Establish Care        HPI: 2023:  Patient presents to clinic today to establish care, does follow outside provider Dr. Portillo for primary care.  Patient was unaware that that is what this appointment was for.  [facility] and is now taking Abilify 30 mg daily.  Patient states that he feels great but does not have psychiatrist to follow-up with.  Patient is amenable to stay primary care managed by Dr. Portillo, would appreciate refill of Abilify till he can get an appointment to see her again and referral to psych at this clinic.      ROS:  Review of Systems   Constitutional:  Negative for chills, fever and weight loss.   HENT:  Negative for ear discharge, nosebleeds and tinnitus.    Eyes:  Negative for blurred vision, photophobia and pain.   Respiratory:  Negative for cough, shortness of breath, wheezing and stridor.    Cardiovascular:  Negative for chest pain, palpitations and orthopnea.   Gastrointestinal:  Negative for abdominal pain, heartburn and nausea.   Genitourinary:  Negative for dysuria, frequency, hematuria and urgency.   Musculoskeletal:  Negative for falls and myalgias.   Skin:  Negative for itching and rash.   Neurological:  Negative for dizziness, sensory change, speech change, focal weakness, seizures, weakness and headaches.   Endo/Heme/Allergies:  Negative for environmental allergies. Does not bruise/bleed easily.   Psychiatric/Behavioral:  Negative for hallucinations and suicidal ideas.     History:     Past Medical History:   Diagnosis Date    Addiction to drug     Bipolar disorder     History of psychiatric hospitalization     Psychiatric exam requested by authority     Schizoaffective disorder     Substance abuse  "      History reviewed. No pertinent surgical history.  History reviewed. No pertinent family history.   Social History     Tobacco Use    Smoking status: Every Day     Packs/day: 1.00     Types: Cigarettes     Passive exposure: Current    Smokeless tobacco: Never   Substance and Sexual Activity    Alcohol use: Not Currently    Drug use: Yes     Types: Amphetamines    Sexual activity: Not Currently        Allergies: Review of patient's allergies indicates:  No Known Allergies  Objective:     Vitals:    06/05/23 0723   BP: 137/80   Pulse: 73   Resp: 18   Temp: 98 °F (36.7 °C)   SpO2: 98%   Weight: 71.6 kg (157 lb 14.4 oz)   Height: 5' 6" (1.676 m)     Body mass index is 25.49 kg/m².     Physical Examination:   Physical Exam  Constitutional:       General: He is not in acute distress.     Appearance: Normal appearance. He is not ill-appearing.   Cardiovascular:      Rate and Rhythm: Normal rate and regular rhythm.      Heart sounds: Normal heart sounds.   Pulmonary:      Effort: Pulmonary effort is normal. No respiratory distress.      Breath sounds: Normal breath sounds.   Musculoskeletal:      Cervical back: Normal range of motion.   Skin:     General: Skin is warm and dry.   Neurological:      Mental Status: He is alert and oriented to person, place, and time.   Psychiatric:         Mood and Affect: Mood normal.         Behavior: Behavior normal.         Thought Content: Thought content is not paranoid or delusional. Thought content does not include homicidal or suicidal ideation. Thought content does not include homicidal or suicidal plan.       Assessment and Plan     Problem List Items Addressed This Visit          Psychiatric    Schizoaffective disorder, bipolar type    Relevant Orders    Ambulatory referral/consult to Behavioral Health     Other Visit Diagnoses       Encounter for wellness examination    -  Primary    Relevant Orders    TSH    T4, Free    Hemoglobin A1C    SYPHILIS ANTIBODY (WITH REFLEX RPR) "    Hepatitis Panel, Acute    Lipid Panel    CBC Auto Differential    Comprehensive Metabolic Panel    HIV 1/2 Ag/Ab (4th Gen)    Vitamin D    Encounter for screening for malignant neoplasm of prostate        Relevant Orders    PSA, Screening              Gerardo was seen today for establish care.    Diagnoses and all orders for this visit:    Encounter for wellness examination  -     TSH  -     T4, Free  -     Hemoglobin A1C  -     SYPHILIS ANTIBODY (WITH REFLEX RPR)  -     Hepatitis Panel, Acute  -     Lipid Panel  -     CBC Auto Differential  -     Comprehensive Metabolic Panel  -     HIV 1/2 Ag/Ab (4th Gen)  -     Vitamin D    Encounter for screening for malignant neoplasm of prostate  -     PSA, Screening    Schizoaffective disorder, bipolar type  -     Ambulatory referral/consult to Behavioral Health; Future    Other orders  -     ARIPiprazole (ABILIFY) 30 MG Tab; Take 1 tablet (30 mg total) by mouth once daily.         Follow up with Psych and Dr. Portillo, states he has appointment next week with Dr. Portillo.      This note was created with the assistance of Dragon voice recognition software or phone dictation. There may be transcription errors as a result of using this technology however minimal. Effort has been made to assure accuracy of transcription but any obvious errors or omissions should be clarified with the author of the document

## 2023-07-12 RX ORDER — ARIPIPRAZOLE 30 MG/1
30 TABLET ORAL DAILY
Qty: 30 TABLET | Refills: 0 | OUTPATIENT
Start: 2023-07-12 | End: 2023-10-10

## 2023-07-14 DIAGNOSIS — F25.0 SCHIZOAFFECTIVE DISORDER, BIPOLAR TYPE: Primary | ICD-10-CM

## 2023-07-14 RX ORDER — ARIPIPRAZOLE 30 MG/1
30 TABLET ORAL DAILY
Qty: 30 TABLET | Refills: 1 | Status: ON HOLD | OUTPATIENT
Start: 2023-07-14 | End: 2023-10-04 | Stop reason: HOSPADM

## 2023-07-14 NOTE — PROGRESS NOTES
7/14/2023    HPI  Pt presented to clinic today for scheduled evaluation.  During rooming process, pt voiced that he wasn't interested in psychiatric evaluation and wanted to continue working with his primary care provider.  Seeing outside provider for primary care currently.  Has a historical diagnosis of schizoaffective disorder, confirmed at last psychiatric hospitalization.  No SE or allergies reported.  Pt voiced he needed refills of his medication.  Will refill abilify x2 months, further refills deferred to PCP.  Pt welcome to return to clinic should he change his mind about establishing with us.      Assessment:   Schizoaffective disorder    PE:   Casually dressed appears stated age  Psychomotor activated  Irritable mood  Fair insight    Plan:   Will refill abilify x2 months  Further refills per PCP  Pt welcome to return to clinic should he desire evaluation    Chencho Kwong MD  [facility] Psychiatry  [facility]  7/14/2023, 9:20 AM

## 2023-09-28 ENCOUNTER — HOSPITAL ENCOUNTER (INPATIENT)
Facility: HOSPITAL | Age: 53
LOS: 6 days | Discharge: HOME | DRG: 885 | End: 2023-10-04
Attending: PSYCHIATRY & NEUROLOGY | Admitting: PSYCHIATRY & NEUROLOGY
Payer: MEDICAID

## 2023-09-28 DIAGNOSIS — F20.9 SCHIZOPHRENIA: ICD-10-CM

## 2023-09-28 PROCEDURE — 12400001 HC PSYCH SEMI-PRIVATE ROOM

## 2023-09-28 RX ORDER — DIPHENHYDRAMINE HYDROCHLORIDE 50 MG/ML
50 INJECTION INTRAMUSCULAR; INTRAVENOUS EVERY 6 HOURS PRN
Status: DISCONTINUED | OUTPATIENT
Start: 2023-09-28 | End: 2023-10-04 | Stop reason: HOSPADM

## 2023-09-28 RX ORDER — LORAZEPAM 1 MG/1
2 TABLET ORAL EVERY 6 HOURS PRN
Status: DISCONTINUED | OUTPATIENT
Start: 2023-09-28 | End: 2023-10-04 | Stop reason: HOSPADM

## 2023-09-28 RX ORDER — ADHESIVE BANDAGE
30 BANDAGE TOPICAL DAILY PRN
Status: DISCONTINUED | OUTPATIENT
Start: 2023-09-28 | End: 2023-10-04 | Stop reason: HOSPADM

## 2023-09-28 RX ORDER — DIPHENHYDRAMINE HCL 50 MG
50 CAPSULE ORAL EVERY 6 HOURS PRN
Status: DISCONTINUED | OUTPATIENT
Start: 2023-09-28 | End: 2023-10-04 | Stop reason: HOSPADM

## 2023-09-28 RX ORDER — IBUPROFEN 200 MG
1 TABLET ORAL DAILY
Status: DISCONTINUED | OUTPATIENT
Start: 2023-09-29 | End: 2023-10-04 | Stop reason: HOSPADM

## 2023-09-28 RX ORDER — HALOPERIDOL 5 MG/1
10 TABLET ORAL EVERY 6 HOURS PRN
Status: DISCONTINUED | OUTPATIENT
Start: 2023-09-28 | End: 2023-10-04 | Stop reason: HOSPADM

## 2023-09-28 RX ORDER — HYDROXYZINE HYDROCHLORIDE 50 MG/1
50 TABLET, FILM COATED ORAL EVERY 4 HOURS PRN
Status: DISCONTINUED | OUTPATIENT
Start: 2023-09-28 | End: 2023-10-04 | Stop reason: HOSPADM

## 2023-09-28 RX ORDER — LORAZEPAM 2 MG/ML
2 INJECTION INTRAMUSCULAR EVERY 6 HOURS PRN
Status: DISCONTINUED | OUTPATIENT
Start: 2023-09-28 | End: 2023-10-04 | Stop reason: HOSPADM

## 2023-09-28 RX ORDER — ACETAMINOPHEN 325 MG/1
650 TABLET ORAL EVERY 6 HOURS PRN
Status: DISCONTINUED | OUTPATIENT
Start: 2023-09-28 | End: 2023-10-04 | Stop reason: HOSPADM

## 2023-09-28 RX ORDER — MAG HYDROX/ALUMINUM HYD/SIMETH 200-200-20
30 SUSPENSION, ORAL (FINAL DOSE FORM) ORAL EVERY 6 HOURS PRN
Status: DISCONTINUED | OUTPATIENT
Start: 2023-09-28 | End: 2023-10-04 | Stop reason: HOSPADM

## 2023-09-28 RX ORDER — ONDANSETRON 4 MG/1
4 TABLET, ORALLY DISINTEGRATING ORAL EVERY 6 HOURS PRN
Status: DISCONTINUED | OUTPATIENT
Start: 2023-09-28 | End: 2023-10-04 | Stop reason: HOSPADM

## 2023-09-28 RX ORDER — HALOPERIDOL 5 MG/ML
10 INJECTION INTRAMUSCULAR EVERY 6 HOURS PRN
Status: DISCONTINUED | OUTPATIENT
Start: 2023-09-28 | End: 2023-10-04 | Stop reason: HOSPADM

## 2023-09-28 RX ORDER — TRAZODONE HYDROCHLORIDE 100 MG/1
100 TABLET ORAL NIGHTLY PRN
Status: DISCONTINUED | OUTPATIENT
Start: 2023-09-28 | End: 2023-10-04 | Stop reason: HOSPADM

## 2023-09-29 LAB
ALBUMIN SERPL-MCNC: 4 G/DL (ref 3.5–5)
ALBUMIN/GLOB SERPL: 1.4 RATIO (ref 1.1–2)
ALP SERPL-CCNC: 59 UNIT/L (ref 40–150)
ALT SERPL-CCNC: 18 UNIT/L (ref 0–55)
AST SERPL-CCNC: 20 UNIT/L (ref 5–34)
BASOPHILS # BLD AUTO: 0.05 X10(3)/MCL
BASOPHILS NFR BLD AUTO: 0.5 %
BILIRUB SERPL-MCNC: 0.9 MG/DL
BUN SERPL-MCNC: 10.7 MG/DL (ref 8.4–25.7)
CALCIUM SERPL-MCNC: 9.6 MG/DL (ref 8.4–10.2)
CHLORIDE SERPL-SCNC: 105 MMOL/L (ref 98–107)
CHOLEST SERPL-MCNC: 303 MG/DL
CHOLEST/HDLC SERPL: 7 {RATIO} (ref 0–5)
CO2 SERPL-SCNC: 28 MMOL/L (ref 22–29)
CREAT SERPL-MCNC: 0.83 MG/DL (ref 0.73–1.18)
EOSINOPHIL # BLD AUTO: 0.19 X10(3)/MCL (ref 0–0.9)
EOSINOPHIL NFR BLD AUTO: 1.8 %
ERYTHROCYTE [DISTWIDTH] IN BLOOD BY AUTOMATED COUNT: 14.9 % (ref 11.5–17)
GFR SERPLBLD CREATININE-BSD FMLA CKD-EPI: >60 MLS/MIN/1.73/M2
GLOBULIN SER-MCNC: 2.9 GM/DL (ref 2.4–3.5)
GLUCOSE SERPL-MCNC: 84 MG/DL (ref 74–100)
HCT VFR BLD AUTO: 48.2 % (ref 42–52)
HDLC SERPL-MCNC: 44 MG/DL (ref 35–60)
HGB BLD-MCNC: 16.2 G/DL (ref 14–18)
IMM GRANULOCYTES # BLD AUTO: 0.03 X10(3)/MCL (ref 0–0.04)
IMM GRANULOCYTES NFR BLD AUTO: 0.3 %
LDLC SERPL CALC-MCNC: 226 MG/DL (ref 50–140)
LYMPHOCYTES # BLD AUTO: 3.4 X10(3)/MCL (ref 0.6–4.6)
LYMPHOCYTES NFR BLD AUTO: 31.4 %
MCH RBC QN AUTO: 31 PG (ref 27–31)
MCHC RBC AUTO-ENTMCNC: 33.6 G/DL (ref 33–36)
MCV RBC AUTO: 92.3 FL (ref 80–94)
MONOCYTES # BLD AUTO: 0.76 X10(3)/MCL (ref 0.1–1.3)
MONOCYTES NFR BLD AUTO: 7 %
NEUTROPHILS # BLD AUTO: 6.39 X10(3)/MCL (ref 2.1–9.2)
NEUTROPHILS NFR BLD AUTO: 59 %
NRBC BLD AUTO-RTO: 0 %
PLATELET # BLD AUTO: 369 X10(3)/MCL (ref 130–400)
PMV BLD AUTO: 9.4 FL (ref 7.4–10.4)
POTASSIUM SERPL-SCNC: 4.8 MMOL/L (ref 3.5–5.1)
PROT SERPL-MCNC: 6.9 GM/DL (ref 6.4–8.3)
RBC # BLD AUTO: 5.22 X10(6)/MCL (ref 4.7–6.1)
SODIUM SERPL-SCNC: 142 MMOL/L (ref 136–145)
T PALLIDUM AB SER QL: NONREACTIVE
TRIGL SERPL-MCNC: 166 MG/DL (ref 34–140)
TSH SERPL-ACNC: 1.04 UIU/ML (ref 0.35–4.94)
VLDLC SERPL CALC-MCNC: 33 MG/DL
WBC # SPEC AUTO: 10.82 X10(3)/MCL (ref 4.5–11.5)

## 2023-09-29 PROCEDURE — 11400000 HC PSYCH PRIVATE ROOM

## 2023-09-29 PROCEDURE — 25000003 PHARM REV CODE 250

## 2023-09-29 PROCEDURE — 80053 COMPREHEN METABOLIC PANEL: CPT

## 2023-09-29 PROCEDURE — 86780 TREPONEMA PALLIDUM: CPT

## 2023-09-29 PROCEDURE — 80061 LIPID PANEL: CPT

## 2023-09-29 PROCEDURE — 85025 COMPLETE CBC W/AUTO DIFF WBC: CPT

## 2023-09-29 PROCEDURE — 84443 ASSAY THYROID STIM HORMONE: CPT

## 2023-09-29 RX ORDER — OLANZAPINE 10 MG/2ML
10 INJECTION, POWDER, FOR SOLUTION INTRAMUSCULAR DAILY PRN
Status: DISCONTINUED | OUTPATIENT
Start: 2023-09-29 | End: 2023-09-29

## 2023-09-29 RX ORDER — OLANZAPINE 5 MG/1
10 TABLET, ORALLY DISINTEGRATING ORAL NIGHTLY
Status: DISCONTINUED | OUTPATIENT
Start: 2023-09-29 | End: 2023-09-29

## 2023-09-29 RX ORDER — OLANZAPINE 5 MG/1
10 TABLET, ORALLY DISINTEGRATING ORAL DAILY
Status: DISCONTINUED | OUTPATIENT
Start: 2023-09-29 | End: 2023-09-29

## 2023-09-29 RX ORDER — ARIPIPRAZOLE 5 MG/1
10 TABLET ORAL DAILY
Status: DISCONTINUED | OUTPATIENT
Start: 2023-09-29 | End: 2023-10-04 | Stop reason: HOSPADM

## 2023-09-29 RX ADMIN — ARIPIPRAZOLE 10 MG: 5 TABLET ORAL at 10:09

## 2023-09-29 RX ADMIN — TRAZODONE HYDROCHLORIDE 100 MG: 100 TABLET ORAL at 08:09

## 2023-09-29 NOTE — H&P
[facility] - Behavioral Health Unit  History & Physical    Subjective:      Chief Complaint/Reason for Admission: PARANOIA    Gerardo Lopez is a 53 y.o. male. History of paranoia/ substance abuse/ rosendo    No past medical history on file.  No past surgical history on file.  No family history on file.  Social History     Tobacco Use    Smoking status: Every Day     Current packs/day: 1.00     Types: Cigarettes     Passive exposure: Current    Smokeless tobacco: Never   Substance Use Topics    Alcohol use: Not Currently    Drug use: Yes     Types: Amphetamines, Marijuana, Methamphetamines       PTA Medications   Medication Sig    ARIPiprazole (ABILIFY) 30 MG Tab Take 1 tablet (30 mg total) by mouth once daily.     Review of patient's allergies indicates:  No Known Allergies     Review of Systems   Constitutional: Negative.    HENT: Negative.     Eyes: Negative.    Respiratory: Negative.     Cardiovascular: Negative.    Gastrointestinal: Negative.    Genitourinary: Negative.    Musculoskeletal: Negative.    Skin: Negative.    Neurological: Negative.    Endo/Heme/Allergies: Negative.    Psychiatric/Behavioral:  Positive for hallucinations and substance abuse. Negative for depression and suicidal ideas. The patient is nervous/anxious.        Objective:      Vital Signs (Most Recent)  Temp: (P) 98.5 °F (36.9 °C) (09/29/23 1100)  Pulse: (P) 81 (09/29/23 1100)  Resp: (P) 18 (09/29/23 1100)  BP: (P) 113/85 (09/29/23 1100)  SpO2: (P) 98 % (09/29/23 1100)    Vital Signs Range (Last 24H):  Temp:  [97.7 °F (36.5 °C)-98.5 °F (36.9 °C)]   Pulse:  [81-98]   Resp:  [18-22]   BP: (130-141)/(45-87)   SpO2:  [97 %-99 %]     Physical Exam  HENT:      Head: Normocephalic.      Right Ear: Tympanic membrane normal.      Left Ear: Tympanic membrane normal.      Nose: Nose normal.      Mouth/Throat:      Mouth: Mucous membranes are moist.   Eyes:      Extraocular Movements: Extraocular movements intact.      Pupils:  Pupils are equal, round, and reactive to light.   Cardiovascular:      Rate and Rhythm: Normal rate and regular rhythm.   Pulmonary:      Effort: Pulmonary effort is normal.   Abdominal:      General: Abdomen is flat.   Musculoskeletal:         General: Normal range of motion.   Skin:     General: Skin is warm.   Neurological:      General: No focal deficit present.      Mental Status: He is alert and oriented to person, place, and time.      Comments: Vision normal   Hearing normal   EOM intact   Face muscles normal  Facial sensation normal   Shrugs shoulders  Tongue midline      Psychiatric:      Comments: Paranoia with rosendo         Data Review:    Recent Results (from the past 48 hour(s))   Comprehensive metabolic panel    Collection Time: 09/28/23 10:49 AM   Result Value Ref Range    Sodium Level 139 136 - 145 mmol/L    Potassium Level 3.9 3.5 - 5.1 mmol/L    Chloride 108 (H) 98 - 107 mmol/L    Carbon Dioxide 24 22 - 29 mmol/L    Glucose Level 105 (H) 74 - 100 mg/dL    Blood Urea Nitrogen 14.4 8.4 - 25.7 mg/dL    Creatinine 0.71 (L) 0.73 - 1.18 mg/dL    Calcium Level Total 8.9 8.4 - 10.2 mg/dL    Protein Total 6.2 (L) 6.4 - 8.3 gm/dL    Albumin Level 3.8 3.5 - 5.0 g/dL    Globulin 2.4 2.4 - 3.5 gm/dL    Albumin/Globulin Ratio 1.6 1.1 - 2.0 ratio    Bilirubin Total 0.5 <=1.5 mg/dL    Alkaline Phosphatase 49 40 - 150 unit/L    Alanine Aminotransferase 18 0 - 55 unit/L    Aspartate Aminotransferase 19 5 - 34 unit/L    eGFR >60 mls/min/1.73/m2   TSH    Collection Time: 09/28/23 10:49 AM   Result Value Ref Range    Thyroid Stimulating Hormone 0.621 0.350 - 4.940 uIU/mL   Ethanol    Collection Time: 09/28/23 10:49 AM   Result Value Ref Range    Ethanol Level <10.0 <=10.0 mg/dL   Acetaminophen level    Collection Time: 09/28/23 10:49 AM   Result Value Ref Range    Acetaminophen Level <17.4 (L) 17.4 - 30.0 ug/ml   CBC with Differential    Collection Time: 09/28/23 10:49 AM   Result Value Ref Range    WBC 8.61 4.50 -  11.50 x10(3)/mcL    RBC 4.63 (L) 4.70 - 6.10 x10(6)/mcL    Hgb 14.2 14.0 - 18.0 g/dL    Hct 41.6 (L) 42.0 - 52.0 %    MCV 89.8 80.0 - 94.0 fL    MCH 30.7 27.0 - 31.0 pg    MCHC 34.1 33.0 - 36.0 g/dL    RDW 15.1 11.5 - 17.0 %    Platelet 310 130 - 400 x10(3)/mcL    MPV 9.2 7.4 - 10.4 fL    Neut % 56.3 %    Lymph % 30.9 %    Mono % 10.8 %    Eos % 1.4 %    Basophil % 0.3 %    Lymph # 2.66 0.6 - 4.6 x10(3)/mcL    Neut # 4.84 2.1 - 9.2 x10(3)/mcL    Mono # 0.93 0.1 - 1.3 x10(3)/mcL    Eos # 0.12 0 - 0.9 x10(3)/mcL    Baso # 0.03 <=0.2 x10(3)/mcL    IG# 0.03 0 - 0.04 x10(3)/mcL    IG% 0.3 %    NRBC% 0.0 %   COVID/FLU A&B PCR    Collection Time: 09/28/23 10:58 AM   Result Value Ref Range    Influenza A PCR Not Detected Not Detected    Influenza B PCR Not Detected Not Detected    SARS-CoV-2 PCR Not Detected Not Detected, Negative, Invalid   Urinalysis, Reflex to Urine Culture    Collection Time: 09/28/23 12:21 PM    Specimen: Urine   Result Value Ref Range    Color, UA Yellow Yellow, Light-Yellow, Dark Yellow, Sima, Straw    Appearance, UA Clear Clear    Specific Gravity, UA 1.027 1.005 - 1.030    pH, UA 7.0 5.0 - 8.5    Protein, UA Negative Negative    Glucose, UA Negative Negative, Normal    Ketones, UA Negative Negative    Blood, UA Negative Negative    Bilirubin, UA Negative Negative    Urobilinogen, UA 1.0 0.2, 1.0, Normal    Nitrites, UA Negative Negative    Leukocyte Esterase, UA Negative Negative   Drug Screen, Urine    Collection Time: 09/28/23 12:21 PM   Result Value Ref Range    Amphetamines, Urine Positive (A) Negative    Barbituates, Urine Negative Negative    Benzodiazepine, Urine Negative Negative    Cannabinoids, Urine Positive (A) Negative    Cocaine, Urine Positive (A) Negative    Fentanyl, Urine Negative Negative    MDMA, Urine Negative Negative    Opiates, Urine Negative Negative    Phencyclidine, Urine Negative Negative    pH, Urine 7.0 3.0 - 11.0   Urinalysis, Microscopic    Collection Time: 09/28/23  12:21 PM   Result Value Ref Range    RBC, UA <5 <=5 /HPF    WBC, UA <5 <=5 /HPF    Squamous Epithelial Cells, UA <5 <=5 /HPF    Bacteria, UA None Seen None Seen, Rare, Occasional /HPF   Comprehensive Metabolic Panel    Collection Time: 09/29/23  6:50 AM   Result Value Ref Range    Sodium Level 142 136 - 145 mmol/L    Potassium Level 4.8 3.5 - 5.1 mmol/L    Chloride 105 98 - 107 mmol/L    Carbon Dioxide 28 22 - 29 mmol/L    Glucose Level 84 74 - 100 mg/dL    Blood Urea Nitrogen 10.7 8.4 - 25.7 mg/dL    Creatinine 0.83 0.73 - 1.18 mg/dL    Calcium Level Total 9.6 8.4 - 10.2 mg/dL    Protein Total 6.9 6.4 - 8.3 gm/dL    Albumin Level 4.0 3.5 - 5.0 g/dL    Globulin 2.9 2.4 - 3.5 gm/dL    Albumin/Globulin Ratio 1.4 1.1 - 2.0 ratio    Bilirubin Total 0.9 <=1.5 mg/dL    Alkaline Phosphatase 59 40 - 150 unit/L    Alanine Aminotransferase 18 0 - 55 unit/L    Aspartate Aminotransferase 20 5 - 34 unit/L    eGFR >60 mls/min/1.73/m2   Lipid Panel    Collection Time: 09/29/23  6:50 AM   Result Value Ref Range    Cholesterol Total 303 (H) <=200 mg/dL    HDL Cholesterol 44 35 - 60 mg/dL    Triglyceride 166 (H) 34 - 140 mg/dL    Cholesterol/HDL Ratio 7 (H) 0 - 5    Very Low Density Lipoprotein 33     LDL Cholesterol 226.00 (H) 50.00 - 140.00 mg/dL   SYPHILIS ANTIBODY (WITH REFLEX RPR)    Collection Time: 09/29/23  6:50 AM   Result Value Ref Range    Syphilis Antibody Nonreactive Nonreactive, Equivocal   TSH    Collection Time: 09/29/23  6:50 AM   Result Value Ref Range    Thyroid Stimulating Hormone 1.038 0.350 - 4.940 uIU/mL   CBC with Differential    Collection Time: 09/29/23  6:50 AM   Result Value Ref Range    WBC 10.82 4.50 - 11.50 x10(3)/mcL    RBC 5.22 4.70 - 6.10 x10(6)/mcL    Hgb 16.2 14.0 - 18.0 g/dL    Hct 48.2 42.0 - 52.0 %    MCV 92.3 80.0 - 94.0 fL    MCH 31.0 27.0 - 31.0 pg    MCHC 33.6 33.0 - 36.0 g/dL    RDW 14.9 11.5 - 17.0 %    Platelet 369 130 - 400 x10(3)/mcL    MPV 9.4 7.4 - 10.4 fL    Neut % 59.0 %    Lymph %  31.4 %    Mono % 7.0 %    Eos % 1.8 %    Basophil % 0.5 %    Lymph # 3.40 0.6 - 4.6 x10(3)/mcL    Neut # 6.39 2.1 - 9.2 x10(3)/mcL    Mono # 0.76 0.1 - 1.3 x10(3)/mcL    Eos # 0.19 0 - 0.9 x10(3)/mcL    Baso # 0.05 <=0.2 x10(3)/mcL    IG# 0.03 0 - 0.04 x10(3)/mcL    IG% 0.3 %    NRBC% 0.0 %        No results found.       Assessment and Plan       Paranoia

## 2023-09-29 NOTE — NURSING
"Daily Nursing Note:      Behavior:    Patient (Gerardo Lopez is a 53 y.o. male, : 1970, MRN: [id number]) demonstrating an affect that was flat, anxious, irritable, agitated, angry, expansive,  labile, and inappropriate. Gerardo demonstrating mood that is angry, anxious, and swings. Gerardo had an appearance that was disheveled. Gerardo denies suicidal ideation. Gerardo denies suicide plan. Gerardo denies homicidal ideation. Gerardo endorses hallucinations.    Gerardo's  height is 5' 5" (1.651 m) and weight is 65.9 kg (145 lb 4.5 oz). His oral temperature is 97.7 °F (36.5 °C). His blood pressure is 130/87 and his pulse is 98. His respiration is 22 (abnormal) and oxygen saturation is 97%.     Cars last BM was noted on: 23.      Intervention:    Encourage Gerardo to perform self-hygiene, grooming, and changing of clothing. Monitor aCrs behavior and program compliance. Monitor Gerardo for suicidal ideation, homicidal ideation, sleep disturbance, and hallucinations. Encourage Gerardo to eat all portions of meals and assess for meal preferences. Monitor Gerardo for intake and output to ensure hydration. Notify the Physician/Physician Assistant/Advance Practice Registered Nurse (MD/PA/APRN) for any medication refusal and any change in patient condition.      Response:    Gerardo verbalizes understand of unit process and procedures.       Plan:     Continue to monitor per MD/PA/APRN orders; maintain patient safety.    "

## 2023-09-29 NOTE — PLAN OF CARE
Problem: Adult Inpatient Plan of Care  Goal: Plan of Care Review  Outcome: Ongoing, Progressing  Goal: Patient-Specific Goal (Individualized)  Outcome: Ongoing, Progressing  Goal: Absence of Hospital-Acquired Illness or Injury  Outcome: Ongoing, Progressing  Goal: Optimal Comfort and Wellbeing  Outcome: Ongoing, Progressing  Goal: Readiness for Transition of Care  Outcome: Ongoing, Progressing     Problem: Activity and Energy Impairment (Excessive Substance Use)  Goal: Optimized Energy Level (Excessive Substance Use)  Outcome: Ongoing, Progressing     Problem: Behavior Regulation Impairment (Excessive Substance Use)  Goal: Improved Behavioral Control (Excessive Substance Use)  Outcome: Ongoing, Progressing     Problem: Decreased Participation and Engagement (Excessive Substance Use)  Goal: Increased Participation and Engagement (Excessive Substance Use)  Outcome: Ongoing, Progressing     Problem: Physiologic Impairment (Excessive Substance Use)  Goal: Improved Physiologic Symptoms (Excessive Substance Use)  Outcome: Ongoing, Progressing     Problem: Social, Occupational or Functional Impairment (Excessive Substance Use)  Goal: Enhanced Social, Occupational or Functional Skills (Excessive Substance Use)  Outcome: Ongoing, Progressing     Problem: Behavior Regulation Impairment (Psychotic Signs/Symptoms)  Goal: Improved Behavioral Control (Psychotic Signs/Symptoms)  Outcome: Ongoing, Progressing     Problem: Cognitive Impairment (Psychotic Signs/Symptoms)  Goal: Optimal Cognitive Function (Psychotic Signs/Symptoms)  Outcome: Ongoing, Progressing     Problem: Decreased Participation and Engagement (Psychotic Signs/Symptoms)  Goal: Increased Participation and Engagement (Psychotic Signs/Symptoms)  Outcome: Ongoing, Progressing     Problem: Mood Impairment (Psychotic Signs/Symptoms)  Goal: Improved Mood Symptoms (Psychotic Signs/Symptoms)  Outcome: Ongoing, Progressing     Problem: Psychomotor Impairment (Psychotic  Signs/Symptoms)  Goal: Improved Psychomotor Symptoms (Psychotic Signs/Symptoms)  Outcome: Ongoing, Progressing     Problem: Sensory Perception Impairment (Psychotic Signs/Symptoms)  Goal: Decreased Sensory Symptoms (Psychotic Signs/Symptoms)  Outcome: Ongoing, Progressing     Problem: Sleep Disturbance (Psychotic Signs/Symptoms)  Goal: Improved Sleep (Psychotic Signs/Symptoms)  Outcome: Ongoing, Progressing     Problem: Social, Occupational or Functional Impairment (Psychotic Signs/Symptoms)  Goal: Enhanced Social, Occupational or Functional Skills (Psychotic Signs/Symptoms)  Outcome: Ongoing, Progressing     Problem: Violence Risk or Actual  Goal: Anger and Impulse Control  Outcome: Ongoing, Progressing

## 2023-09-29 NOTE — NURSING
Pt admitted on a PEC/CEC to the services of Dr Schultz with a preliminary diagnosis of Psychosis. Pt was transported by [facility] from [facility], body search and skin assessment done by JOSE Beach,mht and  was present, no contraband or wounds noted, belongings were checked by mht, vital signs are stable, pt is not in any physical distress at the current time, pt refused to sign any paperwork or participate in nursing assessment, pt stated he was tired and wanted to go to bed. Per PEC, Pt off of Abilify and Seroquel, agitated and delusional, Hx of Bipolar and Schizophrenia,grandiose,gravely disabled.  Per CEC, 54 y/o male PEC'd secondary to OPC citing irrational behavior, yelling at neighbors, disturbing the peace, not taking meds, delusional, thinks he has 200 million  Dollars, works with [employer] and the president. Pt denies SI or HI, UDS is positive for  Cocaine,amphetamines and thc, pt told ER that  His doctor told him he could wean himself off of his medication, Pt was placed on an OPC by his mother, she wrote that pt has not been wanting to take his medication since May of this yr, He imagines that the neighbors want to hurt him, he yells at them, had bad hygiene, Per ER,  NKDA, no medical hx, will monitor pt for anger,  Psychosis and detox symptoms and will be assisted prn.

## 2023-09-29 NOTE — PROGRESS NOTES
Gerardo refused both TR groups, Alternative material was offered   09/29/23 1400   [facility] Group Therapy   Group Name Therapeutic Recreation   Specific Interventions Skilled Activity Leisure Education and Awareness   Participation Level None   Participation Quality Refused

## 2023-09-29 NOTE — GROUP NOTE
Group Psychotherapy       Group Focus: Promoting Healthy Lifestyles  Group topic: Coping Skills: Therapist explored patients need for increase in effective coping skills to deal with stress or anxiety.       Number of patients in attendance: 8    Group Start Time: 1045  Group End Time:  1130  Groups Date: 9/29/2023  Group Topic:  Behavioral Health  Group Department: [facility] Behavioral Health Unit  Group Facilitators:  Bhavna Nuñez MSW  _____________________________________________________________________    Patient Name: Gerardo Lopez  MRN: [id number]  Patient Class: IP- Psych   Admission Date\Time: 9/28/2023  7:45 PM  Hospital Length of Stay: 1  Primary Care Provider: Khadijah, Primary Doctor     Referred by: Acute Psychiatry Unit Treatment Team     Target symptoms: Substance Abuse and Psychosis     Patient's response to treatment: Not Participating; Pt came in group session for a short time with bizarre behavior. Pt went on a rant to a [employer] and then walked out of the group room.      Progress toward goals: Not progressing     Interval History:      Diagnosis:      Plan: Continue treatment on APU

## 2023-09-29 NOTE — PLAN OF CARE
"Behavioral Health Unit  Psychosocial History and Assessment  Progress Note      Patient Name: Gerardo Lopez YOB: 1970 SW: Elizabeth Santiago Date: 9/29/2023    Chief Complaint: behavior    Consent:     Did the patient consent for an interview with the ? Yes    Did the patient consent for the  to contact family/friend/caregiver?   Yes  Name: carolyn Lopez, Relationship: father, and Contact: [phone]    Did the patient give consent for the  to inform family/friend/caregiver of his/her whereabouts or to discuss discharge planning? Yes    Source of Information: Face to face with patient    Is information obtained from interviews considered reliable?   yes    Reason for Admission:     There are no hospital problems to display for this patient.      History of Present Illness - (Patient Perception):   "I don't know why I'm here. I want to go home. I don't need to be here"    Present biopsychosocial functioning: moderate functioning    Past biopsychosocial functioning: pt has history of schizophrenia    Family and Marital/Relationship History:     Significant Other/Partner Relationships:  Single:  Relationship cutoff    Family Relationships: Strained      Childhood History:     Where was patient raised? [place]    Who raised the patient? Mom and dad      How does patient describe their childhood? Pt would not respond      Who is patient's primary support person? Pt would not respond      Culture and Holiness:     Holiness: Mandaeism    How strong of a role does Gnosticism and spirituality play in patient's life? minor    Episcopal or spiritual concerns regarding treatment: observation of holy days     History of Abuse:   History of Abuse: Denies      Outcome: denies    Psychiatric and Medical History:     History of psychiatric illness or treatment: prior inpatient treatment and has participated in counseling/psychotherapy on an outpatient basis in the " past    Medical history: No past medical history on file.    Substance Abuse History:     Alcohol - (Patient Perspective): pt denies alcohol use  Social History     Substance and Sexual Activity   Alcohol Use Not Currently       Drugs - (Patient Perspective):   Social History     Substance and Sexual Activity   Drug Use Yes    Types: Amphetamines, Marijuana, Methamphetamines       Education:     Currently Enrolled? No       Special Education? Yes    Interested in Completing Education/GED: No    Employment and Financial:     Currently employed? disabled    Source of Income:  Disabled    Able to afford basic needs (food, shelter, utilities)? Yes    Who manages finances/personal affairs? self      Service:     [place]? no    Combat Service? No     Community Resources:     Describe present use of community resources: inpatient mental health     Identify previously used community resources   (Include previous mental health treatment - outpatient and inpatient): inpatient and outpatient mental health     Environmental:     Current living situation:Lives with family    Social Evaluation:     Patient Assets: Average or above intelligence and Supportive family/friends    Patient Limitations: poor coping skills    High risk psychosocial issues that may impact discharge planning:   Poor coping skills    Recommendations:     Anticipated discharge plan:   outpatient follow up    High risk issues requiring early treatment planning and immediate intervention:     Community resources needed for discharge planning:  aftercare treatment sources    Anticipated social work role(s) in treatment and discharge planning: advice and [place], groups, individual as needed, referral to aftercare.    09/29/23 1327   Initial Information   Reason for Admission bizarre bx   Arrived From emergency department   Spiritual Beliefs   Spiritual, Cultural Beliefs, Denominational Practices, Values that Affect Care yes   Description of Beliefs that Will  Affect Care non Advent   Substance Use/Withdrawal   Substance Use Current, used prior to admission   Additional Tobacco Use   How many cigarettes do you typically have per day? 20   Abuse Screen (yes response referral indicated)   Feels Unsafe at Home or Work/School no   Feels Threatened by Someone no   Does anyone try to keep you from having contact with others or doing things outside your home? no   Abuse Details   Physical Abuse No   Sexual Abuse No   Emotional Abuse No   AUDIT-C (Alcohol Use Disorders ID Test)   Alcohol Use In Past Year 0-->never   Alcohol Amount Per Day In Past Year 0-->none   More Than 6 Drinks On One Occasion In Past Year 0-->never   Total Audit C Score 0

## 2023-09-29 NOTE — PROGRESS NOTES
Gerardo is a 53 male admitted for Schziophrenia: Paranoid Type, Amphetamine Abuse, and Cocaine Abuse, with a uds +amp, cocaine, and cannabis. CTRS met with Pt 1:1, Gerardo refused interview despite encouragement. CTRS utilized EMR and observation to complete assessment. Gerardo does not attend his ADL's, was bizarre, appearing manic and paranoid AEB grandiose, expansive, and reporting that he does need to be here at [facility]. Gerardo refused to ID a treatment goal.     09/29/23 0818   General   Admit Date 09/28/23   Primary Diagnosis Schziophrenia: Paranoid Type   Secondary Diagnosis Amphetamine Abuse  and Cocaine Abuse   Buddhism refused to ID   Number of Children 0   Children Living? 0   Occupation unemployed   Does the patient have dentures? No   If you were to take part in activities, which of the following would you prefer? Activities that you do alone   Do you feel like you have enough to keep you busy now? Yes   Do you believe that you have the opportunity for physical activity? Yes   Activity Capabilities Minimum   Subjective   Patient states Refused to ID   Assessment   Mobility ambulates independently   Transfers independently   Visual Acuity normal vision   Visual Perception depth perception;color perception;recognizes letters;recognizes numbers   Hearing normal   Speech/Communication normal   Cognitive Concerns oriented x4;problem solving;concentration;attention span   Emotional Concerns appears agitated;excessive emotional response;anger;critical of self or others;appears isolated   Leisure Interest Survey   Leisure Interest Survey No  (Refused to ID)   Goals   Additional Documentation no  (Refused to ID)   Plan   Planned Therapy Intervention Group Recreational Therapy   Expected Length of Stay 5-7days   PT Frequency Minimum of 3 visits per week

## 2023-09-29 NOTE — H&P
9/29/2023  Gerardo Lopez   1970   [id number]            Psychiatry Inpatient Admission Note    Date of Admission: 9/28/2023  7:45 PM    Current Legal Status: Physician's Emergency Certificate    Chief Complaint: I don't need to be here    SUBJECTIVE:   History of Present Illness:   Gerardo Lopez is a 53 y.o. male placed under a PEC [place] [facility] [place] being OPCd by his mother after having very bizarre behavior.Patient was recently here in May of this year where he presented with a very similar issue. He was stating that his provider was weaning him off of his medications though if I recall correctly we contacted [facility] and there was no record of any recent visits or any instruction to wean off medications. Patient then was refusing medication and is currently refusing medications and to speak with me. He was very elevated during this interview and argumentative with pressured speech. He stated multiple times that he refuses to take medication and that his mind is clear. He was bizarre and speaking of his  suing his mother. In the ED he made several grandiose statements. He has apparently not been compliant with his medications according to the note from his mother in the ED records. Patient walked out of the exam room prior to completing the exam. Will initiate Zyprexa Zydis IM if patient refuses oral medication for the safety of this patient. Patient is currently not of sound mind to make an informed decision regarding his medication. Will reattempt     The below information was pulled from previous records on 5/11/23    Past Psychiatric History:   Previous Psychiatric Hospitalizations: States twice   Previous Medication Trials: Multiple. Most recent Abilify and Seroquel  Previous Suicide Attempts: Denies   Outpatient psychiatrist: Dr Burton with [facility]    Past Medical/Surgical History:   No past medical history on file.  No past surgical history on file.      Family Psychiatric History:   Mother -  Schizophrenia and Bipolar      Allergies:   Review of patient's allergies indicates:  No Known Allergies     Substance Abuse History:   Tobacco: 1 PPD  Alcohol: Denies  Illicit Substances: Methamphetamine, THC  Treatment: Denies        Current Medications:   Home Psychiatric Meds: Abilify 30 mg, Seroquel 300 mg    Scheduled Meds:    nicotine  1 patch Transdermal Daily      PRN Meds: acetaminophen, aluminum-magnesium hydroxide-simethicone, haloperidoL **AND** diphenhydrAMINE **AND** LORazepam **AND** haloperidol lactate **AND** diphenhydrAMINE **AND** lorazepam, hydrOXYzine HCL, magnesium hydroxide 400 mg/5 ml, ondansetron, traZODone   Psychotherapeutics (From admission, onward)      Start     Stop Route Frequency Ordered    09/28/23 1947  traZODone tablet 100 mg         -- Oral Nightly PRN 09/28/23 1947    09/28/23 1947  haloperidoL tablet 10 mg  (Med - Acute  Behavioral Management)        See Hyperspace for full Linked Orders Report.    -- Oral Every 6 hours PRN 09/28/23 1947    09/28/23 1947  LORazepam tablet 2 mg  (Med - Acute  Behavioral Management)        See Hyperspace for full Linked Orders Report.    -- Oral Every 6 hours PRN 09/28/23 1947    09/28/23 1947  haloperidol lactate injection 10 mg  (Med - Acute  Behavioral Management)        See Hyperspace for full Linked Orders Report.    -- IM Every 6 hours PRN 09/28/23 1947    09/28/23 1947  LORazepam injection 2 mg  (Med - Acute  Behavioral Management)        See Hyperspace for full Linked Orders Report.    -- IM Every 6 hours PRN 09/28/23 1947              Social History:  Housing Status: Lives with father  Relationship Status/Sexual Orientation: Single   Children: Denies  Education: College degree   Employment Status/Info: Disabled    history: Denies  History of physical/sexual abuse: Denies   Access to gun: Denies         Legal History:   Past Charges/Incarcerations: Denies   Pending charges: Denies      OBJECTIVE:   Medical Review Of Systems:  A  comprehensive review of systems was negative.    Vitals   Vitals:    09/28/23 1955   BP: 130/87   Pulse: 98   Resp: (!) 22   Temp: 97.7 °F (36.5 °C)        Labs/Imaging/Studies:   Recent Results (from the past 48 hour(s))   Comprehensive metabolic panel    Collection Time: 09/28/23 10:49 AM   Result Value Ref Range    Sodium Level 139 136 - 145 mmol/L    Potassium Level 3.9 3.5 - 5.1 mmol/L    Chloride 108 (H) 98 - 107 mmol/L    Carbon Dioxide 24 22 - 29 mmol/L    Glucose Level 105 (H) 74 - 100 mg/dL    Blood Urea Nitrogen 14.4 8.4 - 25.7 mg/dL    Creatinine 0.71 (L) 0.73 - 1.18 mg/dL    Calcium Level Total 8.9 8.4 - 10.2 mg/dL    Protein Total 6.2 (L) 6.4 - 8.3 gm/dL    Albumin Level 3.8 3.5 - 5.0 g/dL    Globulin 2.4 2.4 - 3.5 gm/dL    Albumin/Globulin Ratio 1.6 1.1 - 2.0 ratio    Bilirubin Total 0.5 <=1.5 mg/dL    Alkaline Phosphatase 49 40 - 150 unit/L    Alanine Aminotransferase 18 0 - 55 unit/L    Aspartate Aminotransferase 19 5 - 34 unit/L    eGFR >60 mls/min/1.73/m2   TSH    Collection Time: 09/28/23 10:49 AM   Result Value Ref Range    Thyroid Stimulating Hormone 0.621 0.350 - 4.940 uIU/mL   Ethanol    Collection Time: 09/28/23 10:49 AM   Result Value Ref Range    Ethanol Level <10.0 <=10.0 mg/dL   Acetaminophen level    Collection Time: 09/28/23 10:49 AM   Result Value Ref Range    Acetaminophen Level <17.4 (L) 17.4 - 30.0 ug/ml   CBC with Differential    Collection Time: 09/28/23 10:49 AM   Result Value Ref Range    WBC 8.61 4.50 - 11.50 x10(3)/mcL    RBC 4.63 (L) 4.70 - 6.10 x10(6)/mcL    Hgb 14.2 14.0 - 18.0 g/dL    Hct 41.6 (L) 42.0 - 52.0 %    MCV 89.8 80.0 - 94.0 fL    MCH 30.7 27.0 - 31.0 pg    MCHC 34.1 33.0 - 36.0 g/dL    RDW 15.1 11.5 - 17.0 %    Platelet 310 130 - 400 x10(3)/mcL    MPV 9.2 7.4 - 10.4 fL    Neut % 56.3 %    Lymph % 30.9 %    Mono % 10.8 %    Eos % 1.4 %    Basophil % 0.3 %    Lymph # 2.66 0.6 - 4.6 x10(3)/mcL    Neut # 4.84 2.1 - 9.2 x10(3)/mcL    Mono # 0.93 0.1 - 1.3 x10(3)/mcL     Eos # 0.12 0 - 0.9 x10(3)/mcL    Baso # 0.03 <=0.2 x10(3)/mcL    IG# 0.03 0 - 0.04 x10(3)/mcL    IG% 0.3 %    NRBC% 0.0 %   COVID/FLU A&B PCR    Collection Time: 09/28/23 10:58 AM   Result Value Ref Range    Influenza A PCR Not Detected Not Detected    Influenza B PCR Not Detected Not Detected    SARS-CoV-2 PCR Not Detected Not Detected, Negative, Invalid   Urinalysis, Reflex to Urine Culture    Collection Time: 09/28/23 12:21 PM    Specimen: Urine   Result Value Ref Range    Color, UA Yellow Yellow, Light-Yellow, Dark Yellow, Sima, Straw    Appearance, UA Clear Clear    Specific Gravity, UA 1.027 1.005 - 1.030    pH, UA 7.0 5.0 - 8.5    Protein, UA Negative Negative    Glucose, UA Negative Negative, Normal    Ketones, UA Negative Negative    Blood, UA Negative Negative    Bilirubin, UA Negative Negative    Urobilinogen, UA 1.0 0.2, 1.0, Normal    Nitrites, UA Negative Negative    Leukocyte Esterase, UA Negative Negative   Drug Screen, Urine    Collection Time: 09/28/23 12:21 PM   Result Value Ref Range    Amphetamines, Urine Positive (A) Negative    Barbituates, Urine Negative Negative    Benzodiazepine, Urine Negative Negative    Cannabinoids, Urine Positive (A) Negative    Cocaine, Urine Positive (A) Negative    Fentanyl, Urine Negative Negative    MDMA, Urine Negative Negative    Opiates, Urine Negative Negative    Phencyclidine, Urine Negative Negative    pH, Urine 7.0 3.0 - 11.0   Urinalysis, Microscopic    Collection Time: 09/28/23 12:21 PM   Result Value Ref Range    RBC, UA <5 <=5 /HPF    WBC, UA <5 <=5 /HPF    Squamous Epithelial Cells, UA <5 <=5 /HPF    Bacteria, UA None Seen None Seen, Rare, Occasional /HPF   Comprehensive Metabolic Panel    Collection Time: 09/29/23  6:50 AM   Result Value Ref Range    Sodium Level 142 136 - 145 mmol/L    Potassium Level 4.8 3.5 - 5.1 mmol/L    Chloride 105 98 - 107 mmol/L    Carbon Dioxide 28 22 - 29 mmol/L    Glucose Level 84 74 - 100 mg/dL    Blood Urea Nitrogen  "10.7 8.4 - 25.7 mg/dL    Creatinine 0.83 0.73 - 1.18 mg/dL    Calcium Level Total 9.6 8.4 - 10.2 mg/dL    Protein Total 6.9 6.4 - 8.3 gm/dL    Albumin Level 4.0 3.5 - 5.0 g/dL    Globulin 2.9 2.4 - 3.5 gm/dL    Albumin/Globulin Ratio 1.4 1.1 - 2.0 ratio    Bilirubin Total 0.9 <=1.5 mg/dL    Alkaline Phosphatase 59 40 - 150 unit/L    Alanine Aminotransferase 18 0 - 55 unit/L    Aspartate Aminotransferase 20 5 - 34 unit/L    eGFR >60 mls/min/1.73/m2   Lipid Panel    Collection Time: 09/29/23  6:50 AM   Result Value Ref Range    Cholesterol Total 303 (H) <=200 mg/dL    HDL Cholesterol 44 35 - 60 mg/dL    Triglyceride 166 (H) 34 - 140 mg/dL    Cholesterol/HDL Ratio 7 (H) 0 - 5    Very Low Density Lipoprotein 33     LDL Cholesterol 226.00 (H) 50.00 - 140.00 mg/dL   TSH    Collection Time: 09/29/23  6:50 AM   Result Value Ref Range    Thyroid Stimulating Hormone 1.038 0.350 - 4.940 uIU/mL   CBC with Differential    Collection Time: 09/29/23  6:50 AM   Result Value Ref Range    WBC 10.82 4.50 - 11.50 x10(3)/mcL    RBC 5.22 4.70 - 6.10 x10(6)/mcL    Hgb 16.2 14.0 - 18.0 g/dL    Hct 48.2 42.0 - 52.0 %    MCV 92.3 80.0 - 94.0 fL    MCH 31.0 27.0 - 31.0 pg    MCHC 33.6 33.0 - 36.0 g/dL    RDW 14.9 11.5 - 17.0 %    Platelet 369 130 - 400 x10(3)/mcL    MPV 9.4 7.4 - 10.4 fL    Neut % 59.0 %    Lymph % 31.4 %    Mono % 7.0 %    Eos % 1.8 %    Basophil % 0.5 %    Lymph # 3.40 0.6 - 4.6 x10(3)/mcL    Neut # 6.39 2.1 - 9.2 x10(3)/mcL    Mono # 0.76 0.1 - 1.3 x10(3)/mcL    Eos # 0.19 0 - 0.9 x10(3)/mcL    Baso # 0.05 <=0.2 x10(3)/mcL    IG# 0.03 0 - 0.04 x10(3)/mcL    IG% 0.3 %    NRBC% 0.0 %      No results found for: "PHENYTOIN", "PHENOBARB", "VALPROATE", "CBMZ"        Psychiatric Mental Status Exam:  General Appearance: well-nourished, normal weight, dressed in hospital garb, appears older than stated age, poorly groomed, disheveled  Arousal: alert  Behavior: cooperative, appropriate eye-contact, restless and fidgety, Defiant but " somewhat redirectable  Movements and Motor Activity: no abnormal involuntary movements noted; no tics, no tremors, no akathisia, no dystonia, no evidence of tardive dyskinesia; no psychomotor agitation or retardation  Orientation: intact; oriented fully to person, place, time and situation  Speech: normal volume, conversational, spontaneous, interruptible, pressured, refuses to speak on topics that go against his beliefs or feelings  Mood: Manic, Irritable, and Dysphoric  Affect: dysphoric, irritable, expansive, bizarre  Thought Process: goal-directed, organized,but illogical  Associations: intact, no loosening of associations  Thought Content and Perceptions: no suicidal or homicidal ideation, no auditory or visual hallucinations, probable paranoid ideation, no ideas of reference, (+) evidence of delusions or psychosis  Recent and Remote Memory: intact; per interview/observation with patient  Attention and Concentration: intact; per interview/observation with patient  Fund of Knowledge: intact; based on history, vocabulary, fund of knowledge, syntax, grammar, and content  Insight: impaired due to illness ; based on understanding of severity of illness and HPI  Judgment: impaired due to illness ; based on patient's behavior and HPI      Patient Strengths:  Access to care and Able to verbalize needs        Patient Liabilities:  Medication non-compliance, Substance use, Detrimental home environment, Psychosis, Svetlana, Chronic psychiatric illness, and Family stressors        Discharge Criteria:  Improved mood, Improved thought process, Medication compliance, Decreased anxiety, Improved social functioning, and Motivation for outpatient counseling        Reason for Admission:  The patient poses a significant and immediate danger to self., The patient poses a significant and immediate danger to others due to a psychiatric condition., The patient is gravely disabled due to a psychiatric condition., The psychiatric disorder  requires intensive treatment that necessitates 24 hour observation and care., The patient presents with psychiatric symptoms of sufficient severity to bring about significant or profound impairment of day to day psychological, social, vocational, and/or educational functioning., To stabilize the decompensation of a mental disorder that severely interferes with patient's functioning., and The patient has failed to make sufficient clinical gains within a traditional outpatient setting and severity of presenting symptoms requires inpatient treatment.    ASSESSMENT/PLAN:   Diagnoses:  SUBSTANCE-RELATED DISORDERS; Amphetamine Related Disorders; Amphetamine Abuse  and Cocaine-Related Disorders; Cocaine Abuse  and SCHIZOPHRENIA AND OTHER PSYCHOTIC DISORDERS; Schziophrenia: Paranoid Type  (F20.0)        No past medical history on file.       Problem lists and Management Plans:  -Admit to [facility]    Psychosis  -Zyprexa Zydis 10 mg PO HS  -Will administer IM if patient refuses PO medication    -Will attempt to obtain outside psychiatric records if available  - to assist with aftercare planning and collateral  -Continue inpatient treatment as evidenced by significant psychotic thought disorder, danger to self, danger to others, aggressive behavior, and gravely disabled      Estimated length of stay: 5-7    Estimated Disposition: Home and Substance treatment program    Estimated Follow-up: Outpatient medication management and Substance treatment program      On this date, I have reviewed the medical history and Nursing Assessment, as well as records from referral source.  I have evaluated the mental status of the above named person and concur with the findings of all assessments.  I have provided medical direction for the development of the Treatment Plan.    I conclude that this patient meets admission criteria for inpatient treatment.  I certify that this patient poses a danger to self or others, or would otherwise  be considered gravely disabled based on this assessment and/or provided collateral information.     I have provided medical direction for the development of the Treatment plan.  These services will be provided while this patient is under my care and will be based on an individualized plan of care.  The patient can demonstrate a reasonable expectation of improvement in his/her disorder as a result of the active treatment being provided.      Azar Huang PMHNP-BC

## 2023-09-30 PROCEDURE — 25000003 PHARM REV CODE 250

## 2023-09-30 PROCEDURE — 11400000 HC PSYCH PRIVATE ROOM

## 2023-09-30 RX ADMIN — ARIPIPRAZOLE 10 MG: 5 TABLET ORAL at 08:09

## 2023-09-30 NOTE — NURSING
"PRN Administration Note:    Behavior:    Patient (Gerardo Lopez is a 53 y.o. male, : 1970, MRN: [id number])     Allergies: Patient has no known allergies.    Gerardo's  height is 5' 5" (1.651 m) and weight is 65.9 kg (145 lb 4.5 oz). His oral temperature is 97.5 °F (36.4 °C). His blood pressure is 130/89 and his pulse is 90. His respiration is 18 and oxygen saturation is 100%.     Reason for PRN Administration: Insomnia.    Intervention:    Administered trazodone 100mg po per physician order to Gerardo       Response:    Gerardo tolerated administration well.      Plan:     Continue to monitor per MD/PA/APRN orders; and reevaluate medication effectiveness within 30 minutes.   "

## 2023-09-30 NOTE — PLAN OF CARE
Problem: Adult Inpatient Plan of Care  Goal: Plan of Care Review  Outcome: Ongoing, Progressing  Goal: Patient-Specific Goal (Individualized)  Outcome: Ongoing, Progressing  Goal: Absence of Hospital-Acquired Illness or Injury  Outcome: Ongoing, Progressing  Goal: Optimal Comfort and Wellbeing  Outcome: Ongoing, Progressing  Goal: Readiness for Transition of Care  Outcome: Ongoing, Progressing     Problem: Activity and Energy Impairment (Excessive Substance Use)  Goal: Optimized Energy Level (Excessive Substance Use)  Outcome: Ongoing, Progressing     Problem: Behavior Regulation Impairment (Excessive Substance Use)  Goal: Improved Behavioral Control (Excessive Substance Use)  Outcome: Ongoing, Progressing     Problem: Decreased Participation and Engagement (Excessive Substance Use)  Goal: Increased Participation and Engagement (Excessive Substance Use)  Outcome: Ongoing, Progressing     Problem: Physiologic Impairment (Excessive Substance Use)  Goal: Improved Physiologic Symptoms (Excessive Substance Use)  Outcome: Ongoing, Progressing     Problem: Social, Occupational or Functional Impairment (Excessive Substance Use)  Goal: Enhanced Social, Occupational or Functional Skills (Excessive Substance Use)  Outcome: Ongoing, Progressing     Problem: Behavior Regulation Impairment (Psychotic Signs/Symptoms)  Goal: Improved Behavioral Control (Psychotic Signs/Symptoms)  Outcome: Ongoing, Progressing     Problem: Cognitive Impairment (Psychotic Signs/Symptoms)  Goal: Optimal Cognitive Function (Psychotic Signs/Symptoms)  Outcome: Ongoing, Progressing     Problem: Decreased Participation and Engagement (Psychotic Signs/Symptoms)  Goal: Increased Participation and Engagement (Psychotic Signs/Symptoms)  Outcome: Ongoing, Progressing     Problem: Mood Impairment (Psychotic Signs/Symptoms)  Goal: Improved Mood Symptoms (Psychotic Signs/Symptoms)  Outcome: Ongoing, Progressing     Problem: Psychomotor Impairment (Psychotic  Signs/Symptoms)  Goal: Improved Psychomotor Symptoms (Psychotic Signs/Symptoms)  Outcome: Ongoing, Progressing

## 2023-09-30 NOTE — NURSING
"Daily Nursing Note:      Behavior:    Patient (Gerardo Lopez is a 53 y.o. male, : 1970, MRN: [id number]) demonstrating an affect that was flat and anxious. Gerardo demonstrating mood that is depressed, anxious, and swings. Gerardo had an appearance that was disheveled. Gerardo denies suicidal ideation. Gerardo denies suicide plan. Gerardo denies homicidal ideation. Gerardo endorses hallucinations.    Gerardo's  height is 5' 5" (1.651 m) and weight is 65.9 kg (145 lb 4.5 oz). His temperature is 98.5 °F (36.9 °C). His blood pressure is 113/85 and his pulse is 81. His respiration is 18 and oxygen saturation is 98%.     Cars last BM was noted on: 23      Intervention:    Encourage Gerardo to perform self-hygiene, grooming, and changing of clothing. Monitor Cars behavior and program compliance. Monitor Gerardo for suicidal ideation, homicidal ideation, sleep disturbance, and hallucinations. Encourage Gerardo to eat all portions of meals and assess for meal preferences. Monitor Gerardo for intake and output to ensure hydration. Notify the Physician/Physician Assistant/Advance Practice Registered Nurse (MD/PA/APRN) for any medication refusal and any change in patient condition.      Response:    Gerardo verbalizes understand of unit process and procedures.      Plan:     Continue to monitor per MD/PA/APRN orders; maintain patient safety.   "

## 2023-09-30 NOTE — NURSING
"PRN Medication Follow-up Note:    Behavior:    Patient (Gerardo Lopez is a 53 y.o. male, : 1970, MRN: [id number])     Allergies: Patient has no known allergies.    Cars  height is 5' 5" (1.651 m) and weight is 65.9 kg (145 lb 4.5 oz). His oral temperature is 97.5 °F (36.4 °C). His blood pressure is 130/89 and his pulse is 90. His respiration is 18 and oxygen saturation is 100%.     Administered Trazodone 100mg po_per physician order to Gerardo       Intervention:    Intervention to Gerardo's response: Pt tolerated well.       Response:    Gerardo's response: effective      Plan:     Continue to monitor per MD/PA/APRN orders; and reevaluate medication effectiveness within 30 minutes.   "

## 2023-09-30 NOTE — NURSING
"Daily Nursing Note:      Behavior:    Patient (Gerardo Lopez is a 53 y.o. male, : 1970, MRN: [id number]) demonstrating an affect that was irritable, agitated, and  labile. Gerardo demonstrating mood that is angry and anxious. Gerardo had an appearance that was disheveled and poor hygiene. Gerardo denies suicidal ideation. Gerardo denies suicide plan. Gerardo denies homicidal ideation. Gerardo denies hallucinations.    Gerardo's  height is 5' 5" (1.651 m) and weight is 65.9 kg (145 lb 4.5 oz). His oral temperature is 99 °F (37.2 °C). His blood pressure is 128/86 and his pulse is 79. His respiration is 17 and oxygen saturation is 100%.           Intervention:    Encourage Gerardo to perform self-hygiene, grooming, and changing of clothing. Monitor Gerardo's behavior and program compliance. Monitor Gerardo for suicidal ideation, homicidal ideation, sleep disturbance, and hallucinations. Encourage Gerardo to eat all portions of meals and assess for meal preferences. Monitor Gerardo for intake and output to ensure hydration. Notify the Physician/Physician Assistant/Advance Practice Registered Nurse (MD/PA/APRN) for any medication refusal and any change in patient condition.      Response:    Gerardo verbalizes understand of unit process and procedures. Gerardo reported medications  are being forced to take medications.      Plan:     Continue to monitor per MD/PA/APRN orders; maintain patient safety.   "

## 2023-10-01 PROCEDURE — 25000003 PHARM REV CODE 250

## 2023-10-01 PROCEDURE — 11400000 HC PSYCH PRIVATE ROOM

## 2023-10-01 RX ADMIN — ARIPIPRAZOLE 10 MG: 5 TABLET ORAL at 08:10

## 2023-10-01 NOTE — PLAN OF CARE
Problem: Adult Inpatient Plan of Care  Goal: Plan of Care Review  Outcome: Ongoing, Progressing  Goal: Patient-Specific Goal (Individualized)  Outcome: Ongoing, Progressing  Goal: Absence of Hospital-Acquired Illness or Injury  Outcome: Ongoing, Progressing  Goal: Optimal Comfort and Wellbeing  Outcome: Ongoing, Progressing  Goal: Readiness for Transition of Care  Outcome: Ongoing, Progressing     Problem: Activity and Energy Impairment (Excessive Substance Use)  Goal: Optimized Energy Level (Excessive Substance Use)  Outcome: Ongoing, Progressing     Problem: Behavior Regulation Impairment (Excessive Substance Use)  Goal: Improved Behavioral Control (Excessive Substance Use)  Outcome: Ongoing, Progressing     Problem: Decreased Participation and Engagement (Excessive Substance Use)  Goal: Increased Participation and Engagement (Excessive Substance Use)  Outcome: Ongoing, Progressing     Problem: Physiologic Impairment (Excessive Substance Use)  Goal: Improved Physiologic Symptoms (Excessive Substance Use)  Outcome: Ongoing, Progressing     Problem: Social, Occupational or Functional Impairment (Excessive Substance Use)  Goal: Enhanced Social, Occupational or Functional Skills (Excessive Substance Use)  Outcome: Ongoing, Progressing     Problem: Behavior Regulation Impairment (Psychotic Signs/Symptoms)  Goal: Improved Behavioral Control (Psychotic Signs/Symptoms)  Outcome: Ongoing, Progressing     Problem: Cognitive Impairment (Psychotic Signs/Symptoms)  Goal: Optimal Cognitive Function (Psychotic Signs/Symptoms)  Outcome: Ongoing, Progressing     Problem: Decreased Participation and Engagement (Psychotic Signs/Symptoms)  Goal: Increased Participation and Engagement (Psychotic Signs/Symptoms)  Outcome: Ongoing, Progressing     Problem: Mood Impairment (Psychotic Signs/Symptoms)  Goal: Improved Mood Symptoms (Psychotic Signs/Symptoms)  Outcome: Ongoing, Progressing     Problem: Psychomotor Impairment (Psychotic  Signs/Symptoms)  Goal: Improved Psychomotor Symptoms (Psychotic Signs/Symptoms)  Outcome: Ongoing, Progressing     Problem: Sensory Perception Impairment (Psychotic Signs/Symptoms)  Goal: Decreased Sensory Symptoms (Psychotic Signs/Symptoms)  Outcome: Ongoing, Progressing     Problem: Social, Occupational or Functional Impairment (Psychotic Signs/Symptoms)  Goal: Enhanced Social, Occupational or Functional Skills (Psychotic Signs/Symptoms)  Outcome: Ongoing, Progressing     Problem: Violence Risk or Actual  Goal: Anger and Impulse Control  Outcome: Ongoing, Progressing

## 2023-10-02 PROCEDURE — 11400000 HC PSYCH PRIVATE ROOM

## 2023-10-02 PROCEDURE — 25000003 PHARM REV CODE 250

## 2023-10-02 RX ADMIN — ARIPIPRAZOLE 10 MG: 5 TABLET ORAL at 09:10

## 2023-10-02 NOTE — PLAN OF CARE
Problem: Adult Inpatient Plan of Care  Goal: Plan of Care Review  10/2/2023 0726 by Baylee Garcia LPN  Outcome: Ongoing, Progressing  10/2/2023 0726 by Baylee Garcia LPN  Outcome: Ongoing, Not Progressing  Goal: Patient-Specific Goal (Individualized)  10/2/2023 0726 by Baylee Garcia LPN  Outcome: Ongoing, Progressing  10/2/2023 0726 by Baylee Garcia LPN  Outcome: Ongoing, Not Progressing  Goal: Absence of Hospital-Acquired Illness or Injury  10/2/2023 0726 by Baylee Garcia LPN  Outcome: Ongoing, Progressing  10/2/2023 0726 by Baylee Garcia LPN  Outcome: Ongoing, Not Progressing  Goal: Optimal Comfort and Wellbeing  10/2/2023 0726 by Baylee Garcia LPN  Outcome: Ongoing, Progressing  10/2/2023 0726 by Baylee Garcia LPN  Outcome: Ongoing, Not Progressing  Goal: Readiness for Transition of Care  10/2/2023 0726 by Baylee Garcia LPN  Outcome: Ongoing, Progressing  10/2/2023 0726 by Baylee Garcia LPN  Outcome: Ongoing, Not Progressing     Problem: Activity and Energy Impairment (Excessive Substance Use)  Goal: Optimized Energy Level (Excessive Substance Use)  10/2/2023 0726 by Baylee Garcia LPN  Outcome: Ongoing, Progressing  10/2/2023 0726 by Baylee Garcia LPN  Outcome: Ongoing, Not Progressing     Problem: Behavior Regulation Impairment (Excessive Substance Use)  Goal: Improved Behavioral Control (Excessive Substance Use)  10/2/2023 0726 by Baylee Garcia LPN  Outcome: Ongoing, Progressing  10/2/2023 0726 by Baylee Garcia LPN  Outcome: Ongoing, Not Progressing     Problem: Decreased Participation and Engagement (Excessive Substance Use)  Goal: Increased Participation and Engagement (Excessive Substance Use)  10/2/2023 0726 by Baylee Garcia LPN  Outcome: Ongoing, Progressing  10/2/2023 0726 by Baylee Garcia LPN  Outcome: Ongoing, Not Progressing     Problem: Physiologic Impairment (Excessive Substance Use)  Goal: Improved Physiologic Symptoms (Excessive Substance Use)  10/2/2023 0726 by  Radha, Baylee, LPN  Outcome: Ongoing, Progressing  10/2/2023 0726 by Baylee Garcia LPN  Outcome: Ongoing, Not Progressing     Problem: Social, Occupational or Functional Impairment (Excessive Substance Use)  Goal: Enhanced Social, Occupational or Functional Skills (Excessive Substance Use)  10/2/2023 0726 by Baylee Garcia LPN  Outcome: Ongoing, Progressing  10/2/2023 0726 by Baylee Garcia LPN  Outcome: Ongoing, Not Progressing     Problem: Behavior Regulation Impairment (Psychotic Signs/Symptoms)  Goal: Improved Behavioral Control (Psychotic Signs/Symptoms)  10/2/2023 0726 by Baylee Garcia LPN  Outcome: Ongoing, Progressing  10/2/2023 0726 by Baylee Garcia LPN  Outcome: Ongoing, Not Progressing     Problem: Cognitive Impairment (Psychotic Signs/Symptoms)  Goal: Optimal Cognitive Function (Psychotic Signs/Symptoms)  10/2/2023 0726 by Baylee Garcia LPN  Outcome: Ongoing, Progressing  10/2/2023 0726 by Baylee Garcia LPN  Outcome: Ongoing, Not Progressing     Problem: Decreased Participation and Engagement (Psychotic Signs/Symptoms)  Goal: Increased Participation and Engagement (Psychotic Signs/Symptoms)  10/2/2023 0726 by Baylee Garcia LPN  Outcome: Ongoing, Progressing  10/2/2023 0726 by Baylee Garcia LPN  Outcome: Ongoing, Not Progressing     Problem: Mood Impairment (Psychotic Signs/Symptoms)  Goal: Improved Mood Symptoms (Psychotic Signs/Symptoms)  10/2/2023 0726 by Baylee Garcia LPN  Outcome: Ongoing, Progressing  10/2/2023 0726 by Baylee Garcia LPN  Outcome: Ongoing, Not Progressing     Problem: Psychomotor Impairment (Psychotic Signs/Symptoms)  Goal: Improved Psychomotor Symptoms (Psychotic Signs/Symptoms)  10/2/2023 0726 by Baylee Garcia LPN  Outcome: Ongoing, Progressing  10/2/2023 0726 by Baylee Garcia LPN  Outcome: Ongoing, Not Progressing     Problem: Sensory Perception Impairment (Psychotic Signs/Symptoms)  Goal: Decreased Sensory Symptoms (Psychotic Signs/Symptoms)  10/2/2023  0726 by Baylee Garcia LPN  Outcome: Ongoing, Progressing  10/2/2023 0726 by Baylee Garcia LPN  Outcome: Ongoing, Not Progressing     Problem: Sleep Disturbance (Psychotic Signs/Symptoms)  Goal: Improved Sleep (Psychotic Signs/Symptoms)  10/2/2023 0726 by Baylee Garcia LPN  Outcome: Ongoing, Progressing  10/2/2023 0726 by Baylee Garcia LPN  Outcome: Ongoing, Not Progressing     Problem: Social, Occupational or Functional Impairment (Psychotic Signs/Symptoms)  Goal: Enhanced Social, Occupational or Functional Skills (Psychotic Signs/Symptoms)  10/2/2023 0726 by Baylee Garcia LPN  Outcome: Ongoing, Progressing  10/2/2023 0726 by Baylee Garcia LPN  Outcome: Ongoing, Not Progressing     Problem: Violence Risk or Actual  Goal: Anger and Impulse Control  10/2/2023 0726 by Baylee Garcia LPN  Outcome: Ongoing, Progressing  10/2/2023 0726 by Baylee Garcia LPN  Outcome: Ongoing, Not Progressing

## 2023-10-02 NOTE — GROUP NOTE
Group Psychotherapy       Group Focus: Promoting Healthy Lifestyles     Group topic: Discharge Planning. Therapist explored patients need for identifying personal strengths and qualities in working towards mental health goals. Therapist explored patients understanding and insight on process and needs after treatment.        Number of patients in attendance: 5    Group Start Time: 1045  Group End Time:  1130  Groups Date: 10/2/2023  Group Topic:  Behavioral Health  Group Department: [facility]  Group Facilitators:  Bhavna Nuñez MSW  _____________________________________________________________________    Patient Name: Gerardo Lopez  MRN: [id number]  Patient Class: IP- Psych   Admission Date\Time: 9/28/2023  7:45 PM  Hospital Length of Stay: 4  Primary Care Provider: No, Primary Doctor     Referred by: Acute Psychiatry Unit Treatment Team     Target symptoms: Substance Abuse, Psychosis, and Poor Coping Skills     Patient's response to treatment: Not Participating; Pt was not present in group; alternate provided. SW noted pt had decrease psychosis and hyperverbal behavior. Pt has fair adl's and was neatly dressed.      Progress toward goals: Progressing slowly     Interval History:      Diagnosis:      Plan: Continue treatment on [facility]

## 2023-10-02 NOTE — PROGRESS NOTES
"10/2/2023  Gerardo Lopez   1970   [id number]        Psychiatry Progress Note     Chief Complaint: "My mom got me sent here"    SUBJECTIVE:   Gerardo Lopez is a 53 y.o. male placed under a PEC at [facility] after and OPC was filed by his mother for bizarre behavior.    Patient recently here in May.  He states that he began weaning off of Abilify 2 months ago.  He states that he "brought up the name of the person who is the reason he gets admitted and his mother wouldn't even acknowledge it..'"   Minimizes.  This morning, he states, "It's not like I have the problem.  My mother is a compulsive liar when it comes to this."    At last visit, he went to his father's for 6 weeks, then returned to his mother's house.  Has been taking medication here but states that his plan is to wean off eventually. Discussed the importance of continuing medication.  Will continue current treatment plan and will monitor for the need to augment.       UDS: (+)cocaine, amphetamine, cannabis  Blood alcohol: <10      Current Medications:   Scheduled Meds:    ARIPiprazole  10 mg Oral Daily    nicotine  1 patch Transdermal Daily      PRN Meds: acetaminophen, aluminum-magnesium hydroxide-simethicone, haloperidoL **AND** diphenhydrAMINE **AND** LORazepam **AND** haloperidol lactate **AND** diphenhydrAMINE **AND** lorazepam, hydrOXYzine HCL, magnesium hydroxide 400 mg/5 ml, ondansetron, traZODone   Psychotherapeutics (From admission, onward)      Start     Stop Route Frequency Ordered    09/29/23 1145  ARIPiprazole tablet 10 mg         -- Oral Daily 09/29/23 1035    09/28/23 1947  traZODone tablet 100 mg         -- Oral Nightly PRN 09/28/23 1947 09/28/23 1947  haloperidoL tablet 10 mg  (Med - Acute  Behavioral Management)        See Hyperspace for full Linked Orders Report.    -- Oral Every 6 hours PRN 09/28/23 1947 09/28/23 1947  LORazepam tablet 2 mg  (Med - Acute  Behavioral Management)        See Hyperspace for full Linked " "Orders Report.    -- Oral Every 6 hours PRN 09/28/23 1947    09/28/23 1947  haloperidol lactate injection 10 mg  (Med - Acute  Behavioral Management)        See Hyperspace for full Linked Orders Report.    -- IM Every 6 hours PRN 09/28/23 1947 09/28/23 1947  LORazepam injection 2 mg  (Med - Acute  Behavioral Management)        See Hyperspace for full Linked Orders Report.    -- IM Every 6 hours PRN 09/28/23 1947            Allergies:   Review of patient's allergies indicates:  No Known Allergies     OBJECTIVE:   Vitals   Vitals:    10/01/23 1100   BP: 117/76   Pulse: 78   Resp: 18   Temp: 98.8 °F (37.1 °C)        Labs/Imaging/Studies:   No results found for this or any previous visit (from the past 36 hour(s)).       Medical Review Of Systems:  Constitutional: negative  Respiratory: negative  Cardiovascular: negative  Gastrointestinal: negative  Genitourinary:negative  Musculoskeletal:negative  Neurological: negative       Psychiatric Mental Status Exam:  General Appearance: appears stated age, well-developed, well-nourished  Arousal: alert  Behavior: cooperative  Movements and Motor Activity: no abnormal involuntary movements noted  Orientation: oriented to person, place, time, and situation  Speech: normal rate, normal rhythm, normal volume, normal tone  Mood: "I'm all right"  Affect: improving  Thought Process: linear  Associations: intact  Thought Content and Perceptions: recent psychosis improving, no suicidal ideation, no homicidal ideation  Recent and Remote Memory: recent memory intact, remote memory intact; per interview/observation with patient  Attention and Concentration: intact, attentive to conversation; per interview/observation with patient  Fund of Knowledge: intact, aware of current events, vocabulary appropriate; based on history, vocabulary, fund of knowledge, syntax, grammar, and content  Insight: questonable; based on understanding of severity of illness and HPI  Judgment: questionable; " based on patient's behavior and HPI      ASSESSMENT/PLAN:   Problems Addressed/Diagnoses:  Schizoaffective Disorder, bipolar type (F25.0)  Amphetamine use disorder (F15.20)  Cannabis use disorder (F12.20)    No past medical history on file.     Plan:  Schizoaffective Disorder  -Continue Abilify     Amphetamine use  -Group/Individual psychotherapy     Cannabis use  -Group/Individual psychotherapy     Expected Disposition Plan: Father's home        Tomás Schultz M.D.

## 2023-10-02 NOTE — NURSING
"Daily Nursing Note:      Behavior:    Patient (Gerardo Lopez is a 53 y.o. male, : 1970, MRN: [id number]) demonstrating an affect that was anxious. Gerardo demonstrating mood that is anxious. Gerardo had an appearance that was disheveled. Gerardo denies suicidal ideation. Gerardo denies suicide plan. Gerardo denies homicidal ideation. Gerardo denies hallucinations.    Gerardo's  height is 5' 5" (1.651 m) and weight is 65.9 kg (145 lb 4.5 oz). His temperature is 98.8 °F (37.1 °C). His blood pressure is 117/76 and his pulse is 78. His respiration is 18 and oxygen saturation is 100%.     Gerardo's last BM was noted on: 10/1/23_______      Intervention:    Encourage Gerardo to perform self-hygiene, grooming, and changing of clothing. Monitor Gerardo's behavior and program compliance. Monitor Gerardo for suicidal ideation, homicidal ideation, sleep disturbance, and hallucinations. Encourage Gerardo to eat all portions of meals and assess for meal preferences. Monitor Gerardo for intake and output to ensure hydration. Notify the Physician/Physician Assistant/Advance Practice Registered Nurse (MD/PA/APRN) for any medication refusal and any change in patient condition.      Response:    Gerardo verbalizes understand of unit process and procedures. Gerardo reported medications are not needed because "I don't have a problem".______.      Plan:     Continue to monitor per MD/PA/APRN orders; maintain patient safety.   "

## 2023-10-02 NOTE — NURSING
"Daily Nursing Note:      Behavior:    Patient (Gerardo Lopez is a 53 y.o. male, : 1970, MRN: [id number]) demonstrating an affect that was anxious and  labile. Gerardo demonstrating mood that is anxious. Gerardo had an appearance that was disheveled. Gerardo denies suicidal ideation. Gerardo denies suicide plan. Gerardo denies homicidal ideation. Gerardo denies hallucinations.    Gerardo's  height is 5' 5" (1.651 m) and weight is 65.9 kg (145 lb 4.5 oz). His temperature is 98.8 °F (37.1 °C). His blood pressure is 117/76 and his pulse is 78. His respiration is 18 and oxygen saturation is 100%.         Intervention:    Encourage Gerardo to perform self-hygiene, grooming, and changing of clothing. Monitor Cars behavior and program compliance. Monitor Gerardo for suicidal ideation, homicidal ideation, sleep disturbance, and hallucinations. Encourage Gerardo to eat all portions of meals and assess for meal preferences. Monitor Gerardo for intake and output to ensure hydration. Notify the Physician/Physician Assistant/Advance Practice Registered Nurse (MD/PA/APRN) for any medication refusal and any change in patient condition.      Response:    Gerardo verbalizes understand of unit process and procedures      Plan:     Continue to monitor per MD/PA/APRN orders; maintain patient safety.   "

## 2023-10-03 PROCEDURE — 11400000 HC PSYCH PRIVATE ROOM

## 2023-10-03 PROCEDURE — 25000003 PHARM REV CODE 250

## 2023-10-03 RX ADMIN — ARIPIPRAZOLE 10 MG: 5 TABLET ORAL at 11:10

## 2023-10-03 NOTE — GROUP NOTE
Group Psychotherapy       Group Focus: Communication Skills   Group topic: Communication Skills: Therapist explored patients need for increasing communication skills through assertiveness or active listening.    Number of patients in attendance: 3    Group Start Time: 1045  Group End Time:  1130  Groups Date: 10/3/2023  Group Topic:  Behavioral Health  Group Department: [facility]  Group Facilitators:  Elizabeth Santiago  _____________________________________________________________________    Patient Name: Gerardo Lopez  MRN: [id number]  Patient Class: IP- Psych   Admission Date\Time: 9/28/2023  7:45 PM  Hospital Length of Stay: 5  Primary Care Provider: Khadijah, Primary Doctor     Referred by: Acute Psychiatry Unit Treatment Team     Target symptoms: Substance Abuse     Patient's response to treatment: Pt did not attend group alternative was provided     Progress toward goals:      Interval History:      Diagnosis:      Plan: Pt being discharged tomorrow

## 2023-10-03 NOTE — NURSING
"Daily Nursing Note:      Behavior:    Patient (Gerardo Lopez is a 53 y.o. male, : 1970, MRN: [id number]) demonstrating an affect that was anxious and irritable. Gerardo demonstrating mood that is anxious. Gerardo had an appearance that was disheveled. Gerardo denies suicidal ideation. Gerardo denies suicide plan. Gerardo denies homicidal ideation. Gerardo denies hallucinations.    Gerardo's  height is 5' 5" (1.651 m) and weight is 65.9 kg (145 lb 4.5 oz). His temperature is 98.8 °F (37.1 °C). His blood pressure is 117/76 and his pulse is 78. His respiration is 18 and oxygen saturation is 100%.       Intervention:    Encourage Gerardo to perform self-hygiene, grooming, and changing of clothing. Monitor Gerardo's behavior and program compliance. Monitor Gerardo for suicidal ideation, homicidal ideation, sleep disturbance, and hallucinations. Encourage Gerardo to eat all portions of meals and assess for meal preferences. Monitor Gerardo for intake and output to ensure hydration. Notify the Physician/Physician Assistant/Advance Practice Registered Nurse (MD/PA/APRN) for any medication refusal and any change in patient condition.      Response:    Gerardo verbalizes understand of unit process and procedures.      Plan:     Continue to monitor per MD/PA/APRN orders; maintain patient safety.   "

## 2023-10-04 VITALS
HEIGHT: 65 IN | RESPIRATION RATE: 18 BRPM | HEART RATE: 66 BPM | BODY MASS INDEX: 24.21 KG/M2 | OXYGEN SATURATION: 100 % | WEIGHT: 145.31 LBS | TEMPERATURE: 98 F | SYSTOLIC BLOOD PRESSURE: 127 MMHG | DIASTOLIC BLOOD PRESSURE: 80 MMHG

## 2023-10-04 PROBLEM — F14.20 COCAINE DEPENDENCE, CONTINUOUS: Status: ACTIVE | Noted: 2023-10-04

## 2023-10-04 PROCEDURE — 25000003 PHARM REV CODE 250

## 2023-10-04 RX ORDER — ARIPIPRAZOLE 10 MG/1
10 TABLET ORAL DAILY
Qty: 30 TABLET | Refills: 0 | Status: SHIPPED | OUTPATIENT
Start: 2023-10-05 | End: 2024-10-04

## 2023-10-04 RX ADMIN — ARIPIPRAZOLE 10 MG: 5 TABLET ORAL at 08:10

## 2023-10-04 NOTE — NURSING
"Daily Nursing Note:      Behavior:    Patient (Gerardo Lopez is a 53 y.o. male, : 1970, MRN: [id number]) demonstrating an affect that was anxious. Gerardo demonstrating mood that is anxious. Gerardo had an appearance that was disheveled. Gerardo denies suicidal ideation. Gerardo denies suicide plan. Gerardo denies homicidal ideation. Gerardo denies hallucinations.    Gerardo's  height is 5' 5" (1.651 m) and weight is 65.9 kg (145 lb 4.5 oz). His oral temperature is 97.9 °F (36.6 °C). His blood pressure is 120/83 and his pulse is 59 (abnormal). His respiration is 18 and oxygen saturation is 100%.       Intervention:    Encourage Gerardo to perform self-hygiene, grooming, and changing of clothing. Monitor Gerardo's behavior and program compliance. Monitor Gerardo for suicidal ideation, homicidal ideation, sleep disturbance, and hallucinations. Encourage Gerardo to eat all portions of meals and assess for meal preferences. Monitor Gerardo for intake and output to ensure hydration. Notify the Physician/Physician Assistant/Advance Practice Registered Nurse (MD/PA/APRN) for any medication refusal and any change in patient condition.      Response:    Gerardo verbalizes understand of unit process and procedures.      Plan:     Continue to monitor per MD/PA/APRN orders; maintain patient safety.   "

## 2023-10-04 NOTE — PLAN OF CARE
Problem: Adult Inpatient Plan of Care  Goal: Plan of Care Review  Outcome: Ongoing, Progressing  Goal: Patient-Specific Goal (Individualized)  Outcome: Ongoing, Progressing  Goal: Absence of Hospital-Acquired Illness or Injury  Outcome: Ongoing, Progressing  Goal: Optimal Comfort and Wellbeing  Outcome: Ongoing, Progressing  Goal: Readiness for Transition of Care  Outcome: Ongoing, Progressing     Problem: Activity and Energy Impairment (Excessive Substance Use)  Goal: Optimized Energy Level (Excessive Substance Use)  Outcome: Ongoing, Progressing     Problem: Behavior Regulation Impairment (Excessive Substance Use)  Goal: Improved Behavioral Control (Excessive Substance Use)  Outcome: Ongoing, Progressing     Problem: Decreased Participation and Engagement (Excessive Substance Use)  Goal: Increased Participation and Engagement (Excessive Substance Use)  Outcome: Ongoing, Progressing     Problem: Physiologic Impairment (Excessive Substance Use)  Goal: Improved Physiologic Symptoms (Excessive Substance Use)  Outcome: Ongoing, Progressing     Problem: Social, Occupational or Functional Impairment (Excessive Substance Use)  Goal: Enhanced Social, Occupational or Functional Skills (Excessive Substance Use)  Outcome: Ongoing, Progressing     Problem: Behavior Regulation Impairment (Psychotic Signs/Symptoms)  Goal: Improved Behavioral Control (Psychotic Signs/Symptoms)  Outcome: Ongoing, Progressing     Problem: Cognitive Impairment (Psychotic Signs/Symptoms)  Goal: Optimal Cognitive Function (Psychotic Signs/Symptoms)  Outcome: Ongoing, Progressing

## 2023-10-04 NOTE — CARE UPDATE
Treatment Team    Pt seem for treatment team today with interdisciplinary team.  Pt is Cooperative with Tx team. Pt denies symptoms at this time. MD did not change pt meds at this time. Treatment teams goals Met at this time. Pt DC plan is home. DC date scheduled for 10.4.2023.

## 2023-10-04 NOTE — NURSING
Pt is scheduled for discharge today, voicing no   ADRs or physical complaints at this time, vital signs are stable, pt is not in any physical distress at the current time, currently voicing no suicidal or homicidal ideations at this time, voicing no hallucinations or delusions at this time, voicing no detox symptoms at this time,  Pt was given dc instructions, voiced understanding, pt will receive a dc pkt, it will  Contain RX for dc medication, aftercare appts,  Lab work, and educational material, belonging were packed by mht, pt states he is going home and his family will pick  him up, pt will be brought up to dc area when his transportation arrives.

## 2023-11-03 NOTE — DISCHARGE SUMMARY
"DISCHARGE SUMMARY  PSYCHIATRY      Admit Date: 9/28/2023  7:45 PM    Discharge Date:  10/4/2023    SITE:   [facility] *  [facility]    Discharge Attending Physician: Tomás Schultz M.D.    Chief Complaint:  "Good"    History of Present Illness On Admit:   Gerardo Lopez is a 53 y.o. male [place] [facility] [place]PC[place] by his mother after having very bizarre behavior.Patient was recently here in May of this year where he presented with a very similar issue. He was stating that his provider was weaning him off of his medications though if I recall correctly we contacted [facility] and there was no record of any recent visits or any instruction to wean off medications. Patient then was refusing medication and is currently refusing medications and to speak with me. He was very elevated during this interview and argumentative with pressured speech. He stated multiple times that he refuses to take medication and that his mind is clear. He was bizarre and speaking of his  suing his mother. In the ED he made several grandiose statements. He has apparently not been compliant with his medications according to the note from his mother in the ED records. Patient walked out of the exam room prior to completing the exam. Will initiate Zyprexa Zydis IM if patient refuses oral medication for the safety of this patient. Patient is currently not of sound mind to make an informed decision regarding his medication.     The below information was pulled from previous records on 5/11/23      Admit Mental Status Exam:  General Appearance: well-nourished, normal weight, dressed in hospital garb, appears older than stated age, poorly groomed, disheveled  Arousal: alert  Behavior: cooperative, appropriate eye-contact, restless and fidgety, Defiant but somewhat redirectable  Movements and Motor Activity: no abnormal involuntary movements noted; no tics, no tremors, no akathisia, no " "dystonia, no evidence of tardive dyskinesia; no psychomotor agitation or retardation  Orientation: intact; oriented fully to person, place, time and situation  Speech: normal volume, conversational, spontaneous, interruptible, pressured, refuses to speak on topics that go against his beliefs or feelings  Mood: Manic, Irritable, and Dysphoric  Affect: dysphoric, irritable, expansive, bizarre  Thought Process: goal-directed, organized,but illogical  Associations: intact, no loosening of associations  Thought Content and Perceptions: no suicidal or homicidal ideation, no auditory or visual hallucinations, probable paranoid ideation, no ideas of reference, (+) evidence of delusions or psychosis  Recent and Remote Memory: intact; per interview/observation with patient  Attention and Concentration: intact; per interview/observation with patient  Fund of Knowledge: intact; based on history, vocabulary, fund of knowledge, syntax, grammar, and content  Insight: impaired due to illness ; based on understanding of severity of illness and HPI  Judgment: impaired due to illness ; based on patient's behavior and HPI      Diagnoses:  PRINCIPAL PROBLEM:  Schizoaffective disorder, bipolar type      PROBLEM LIST    Schizoaffective disorder, bipolar type    Methamphetamine addiction    Cannabis dependence, continuous  Cocaine use disorder      Hospital Course:   Patient admitted to [facility] and started on Zyprexa zydis 10mg HS.    10/2/23  Patient recently here in May.  He states that he began weaning off of Abilify 2 months ago.  He states that he "brought up the name of the person who is the reason he gets admitted and his mother wouldn't even acknowledge it..'"   Minimizes.  This morning, he states, "It's not like I have the problem.  My mother is a compulsive liar when it comes to this."     At last visit, he went to his father's for 6 weeks, then returned to his mother's house.  Has been taking medication here but states that his " "plan is to wean off eventually. Discussed the importance of continuing medication.  Will continue current treatment plan and will monitor for the need to augment.         UDS: (+)cocaine, amphetamine, cannabis  Blood alcohol: <10      10/4/23  Due for discharge home today.  Tolerating current medication regimen without issues.  Denies thoughts of self-harm or harm to others.  Will f/u with [facility].  No overt behavioral issues reported by staff.  Will proceed with discharge.      Current Medications:   Scheduled Meds:    ARIPiprazole  10 mg Oral Daily    nicotine  1 patch Transdermal Daily          DISCHARGE EXAMINATION    VITALS   Vitals:    09/30/23 1931 10/01/23 0701 10/01/23 1100 10/03/23 0701   BP: 128/81 117/76 117/76 120/83   BP Location: Left arm   Left arm   Patient Position: Sitting   Sitting   Pulse: 75 78 78 (!) 59   Resp: 18 18 18 18   Temp: 99.1 °F (37.3 °C) 98 °F (36.7 °C) 98.8 °F (37.1 °C) 97.9 °F (36.6 °C)   TempSrc: Oral   Oral   SpO2: 99% 100% 100% 100%   Weight:       Height:             Discharge Mental Status Exam:  General Appearance: appears stated age, well-developed, well-nourished  Arousal: alert  Behavior: cooperative  Movements and Motor Activity: no abnormal involuntary movements noted  Orientation: oriented to person, place, time, and situation  Speech: normal rate, normal rhythm, normal volume, normal tone  Mood: "Good"  Affect: constricted  Thought Process: linear  Associations: intact  Thought Content and Perceptions: no suicidal ideation, no homicidal ideation, no auditory hallucinations, no visual hallucinations, no paranoid ideation, no ideas of reference  Recent and Remote Memory: recent memory intact, remote memory intact; per interview/observation with patient  Attention and Concentration: intact, attentive to conversation; per interview/observation with patient  Fund of Knowledge: intact, aware of current events, vocabulary appropriate; based on history, " vocabulary, fund of knowledge, syntax, grammar, and content  Insight: questionable; based on understanding of severity of illness and HPI  Judgment: questionable; based on patient's behavior and HPI        Discharge Condition:  Stable    Prognosis:  Fair    Justification for >1 antipsychotic:  N/a    Disposition:  discharged to home    Follow-up:     Follow-up Information       Center, [facility] Follow up.    Specialties: Behavioral Health, Psychiatry, Psychology  Why: 10.9.2023 @9am  Bring insurance card and id  Contact information:  [address]ie [place] [id number] [phone]               Isa Cuevas [facility] .    Contact information:  [address] [place] [address]  [phone]                             Medication Regimen:    Current Facility-Administered Medications:     acetaminophen tablet 650 mg, 650 mg, Oral, Q6H PRN, Azar Huang PMHNP    aluminum-magnesium hydroxide-simethicone 200-200-20 mg/5 mL suspension 30 mL, 30 mL, Oral, Q6H PRN, Azar Huang PMHNP    ARIPiprazole tablet 10 mg, 10 mg, Oral, Daily, Azar Huang PMHNP, 10 mg at 10/04/23 0844    haloperidoL tablet 10 mg, 10 mg, Oral, Q6H PRN **AND** diphenhydrAMINE capsule 50 mg, 50 mg, Oral, Q6H PRN **AND** LORazepam tablet 2 mg, 2 mg, Oral, Q6H PRN **AND** haloperidol lactate injection 10 mg, 10 mg, Intramuscular, Q6H PRN **AND** diphenhydrAMINE injection 50 mg, 50 mg, Intramuscular, Q6H PRN **AND** LORazepam injection 2 mg, 2 mg, Intramuscular, Q6H PRN, Azar Huang PMHNP    hydrOXYzine tablet 50 mg, 50 mg, Oral, Q4H PRN, Azar Huang PMHNP    magnesium hydroxide 400 mg/5 ml suspension 2,400 mg, 30 mL, Oral, Daily PRN, Azar Huang PMHNP    nicotine 21 mg/24 hr 1 patch, 1 patch, Transdermal, Daily, Azar Huang PMHNP    ondansetron disintegrating tablet 4 mg, 4 mg, Oral, Q6H PRN, Azar Huang PMHNP    traZODone tablet 100 mg, 100 mg, Oral, Nightly PRN, Azar Huang PMHNP, 100 mg at  09/29/23 2016      Patient Instructions:   Continue medication regimen as prescribed.    Disposition plan per  - see  notes for details.    Patient instructed to call 911 or present to emergency department if any of the following complications develop status post discharge: suicidality, homicidality, or grave disability.     Total time spent discharging patient: <30 minutes      Tomás Schultz M.D.   No

## 2024-02-22 NOTE — PLAN OF CARE
Gerardo refused to ID treatment goals during evaluation. Gerardo met interdisciplinary treatment goal of Improved Behavioral Control  CTRS Discharge Recommendations:  Encouraged Pt. to actively utilize available community resources to increase leisure involvement to decrease signs and symptoms of illness.  Encouraged Pt. to utilize coping skills on a regular basis to reduce the risk of decomposition and re-hospitalization.       Kun Martin (Attending) <<----- Click to Select Surgeon

## 2025-08-25 ENCOUNTER — HOSPITAL ENCOUNTER (EMERGENCY)
Facility: HOSPITAL | Age: 55
Discharge: PSYCHIATRIC OR SUBSTANCE USE FACILITY | End: 2025-08-25
Attending: EMERGENCY MEDICINE
Payer: MEDICAID

## 2025-08-25 VITALS
SYSTOLIC BLOOD PRESSURE: 142 MMHG | TEMPERATURE: 98 F | HEART RATE: 61 BPM | HEIGHT: 70 IN | WEIGHT: 158 LBS | OXYGEN SATURATION: 100 % | RESPIRATION RATE: 16 BRPM | DIASTOLIC BLOOD PRESSURE: 79 MMHG | BODY MASS INDEX: 22.62 KG/M2

## 2025-08-25 DIAGNOSIS — F23 ACUTE PSYCHOSIS: Primary | ICD-10-CM

## 2025-08-25 DIAGNOSIS — Z00.8 MEDICAL CLEARANCE FOR PSYCHIATRIC ADMISSION: ICD-10-CM

## 2025-08-25 LAB
ACCEPTIBLE SP GR UR QL: 1.03 (ref 1–1.03)
ALBUMIN SERPL-MCNC: 3.5 G/DL (ref 3.5–5)
ALBUMIN/GLOB SERPL: 1.1 RATIO (ref 1.1–2)
ALP SERPL-CCNC: 65 UNIT/L (ref 40–150)
ALT SERPL-CCNC: 20 UNIT/L (ref 0–55)
AMPHET UR QL SCN: POSITIVE
ANION GAP SERPL CALC-SCNC: 6 MEQ/L
AST SERPL-CCNC: 22 UNIT/L (ref 11–45)
BACTERIA #/AREA URNS AUTO: ABNORMAL /HPF
BARBITURATE SCN PRESENT UR: NEGATIVE
BASOPHILS # BLD AUTO: 0.03 X10(3)/MCL
BASOPHILS NFR BLD AUTO: 0.5 %
BENZODIAZ UR QL SCN: NEGATIVE
BILIRUB SERPL-MCNC: 0.3 MG/DL
BILIRUB UR QL STRIP.AUTO: NEGATIVE
BUN SERPL-MCNC: 12.3 MG/DL (ref 8.4–25.7)
CALCIUM SERPL-MCNC: 8.6 MG/DL (ref 8.4–10.2)
CANNABINOIDS UR QL SCN: POSITIVE
CHLORIDE SERPL-SCNC: 109 MMOL/L (ref 98–107)
CLARITY UR: CLEAR
CO2 SERPL-SCNC: 24 MMOL/L (ref 22–29)
COCAINE UR QL SCN: NEGATIVE
COLOR UR AUTO: YELLOW
CREAT SERPL-MCNC: 0.67 MG/DL (ref 0.72–1.25)
CREAT/UREA NIT SERPL: 18
EOSINOPHIL # BLD AUTO: 0.14 X10(3)/MCL (ref 0–0.9)
EOSINOPHIL NFR BLD AUTO: 2.2 %
ERYTHROCYTE [DISTWIDTH] IN BLOOD BY AUTOMATED COUNT: 15 % (ref 11.5–17)
ETHANOL SERPL-MCNC: <10 MG/DL
FENTANYL UR QL SCN: NEGATIVE
GFR SERPLBLD CREATININE-BSD FMLA CKD-EPI: >60 ML/MIN/1.73/M2
GLOBULIN SER-MCNC: 3.2 GM/DL (ref 2.4–3.5)
GLUCOSE SERPL-MCNC: 112 MG/DL (ref 74–100)
GLUCOSE UR QL STRIP: NORMAL
HCT VFR BLD AUTO: 42.5 % (ref 42–52)
HGB BLD-MCNC: 14.6 G/DL (ref 14–18)
HGB UR QL STRIP: NEGATIVE
IMM GRANULOCYTES # BLD AUTO: 0.01 X10(3)/MCL (ref 0–0.04)
IMM GRANULOCYTES NFR BLD AUTO: 0.2 %
KETONES UR QL STRIP: NEGATIVE
LEUKOCYTE ESTERASE UR QL STRIP: NEGATIVE
LYMPHOCYTES # BLD AUTO: 2.37 X10(3)/MCL (ref 0.6–4.6)
LYMPHOCYTES NFR BLD AUTO: 37.1 %
MCH RBC QN AUTO: 31.1 PG (ref 27–31)
MCHC RBC AUTO-ENTMCNC: 34.4 G/DL (ref 33–36)
MCV RBC AUTO: 90.4 FL (ref 80–94)
MDMA UR QL SCN: NEGATIVE
MONOCYTES # BLD AUTO: 0.58 X10(3)/MCL (ref 0.1–1.3)
MONOCYTES NFR BLD AUTO: 9.1 %
MUCOUS THREADS URNS QL MICRO: ABNORMAL /LPF
NEUTROPHILS # BLD AUTO: 3.25 X10(3)/MCL (ref 2.1–9.2)
NEUTROPHILS NFR BLD AUTO: 50.9 %
NITRITE UR QL STRIP: NEGATIVE
NRBC BLD AUTO-RTO: 0 %
OPIATES UR QL SCN: NEGATIVE
PCP UR QL: NEGATIVE
PH UR STRIP: 6 [PH]
PH UR: 6 [PH] (ref 3–11)
PLATELET # BLD AUTO: 336 X10(3)/MCL (ref 130–400)
PMV BLD AUTO: 8.8 FL (ref 7.4–10.4)
POTASSIUM SERPL-SCNC: 3.8 MMOL/L (ref 3.5–5.1)
PROT SERPL-MCNC: 6.7 GM/DL (ref 6.4–8.3)
PROT UR QL STRIP: ABNORMAL
RBC # BLD AUTO: 4.7 X10(6)/MCL (ref 4.7–6.1)
RBC #/AREA URNS AUTO: ABNORMAL /HPF
SODIUM SERPL-SCNC: 139 MMOL/L (ref 136–145)
SP GR UR STRIP.AUTO: 1.03 (ref 1–1.03)
SQUAMOUS #/AREA URNS LPF: ABNORMAL /HPF
UROBILINOGEN UR STRIP-ACNC: 2
WBC # BLD AUTO: 6.38 X10(3)/MCL (ref 4.5–11.5)
WBC #/AREA URNS AUTO: ABNORMAL /HPF

## 2025-08-25 PROCEDURE — 82077 ASSAY SPEC XCP UR&BREATH IA: CPT | Performed by: EMERGENCY MEDICINE

## 2025-08-25 PROCEDURE — 80307 DRUG TEST PRSMV CHEM ANLYZR: CPT | Performed by: EMERGENCY MEDICINE

## 2025-08-25 PROCEDURE — 99285 EMERGENCY DEPT VISIT HI MDM: CPT

## 2025-08-25 PROCEDURE — 85025 COMPLETE CBC W/AUTO DIFF WBC: CPT | Performed by: EMERGENCY MEDICINE

## 2025-08-25 PROCEDURE — 80053 COMPREHEN METABOLIC PANEL: CPT | Performed by: EMERGENCY MEDICINE

## 2025-08-25 PROCEDURE — 81001 URINALYSIS AUTO W/SCOPE: CPT | Performed by: EMERGENCY MEDICINE

## 2025-08-25 RX ORDER — DIPHENHYDRAMINE HYDROCHLORIDE 50 MG/ML
50 INJECTION, SOLUTION INTRAMUSCULAR; INTRAVENOUS EVERY 4 HOURS PRN
Status: DISCONTINUED | OUTPATIENT
Start: 2025-08-25 | End: 2025-08-25 | Stop reason: HOSPADM

## 2025-08-25 RX ORDER — LORAZEPAM 1 MG/1
2 TABLET ORAL EVERY 4 HOURS PRN
Status: DISCONTINUED | OUTPATIENT
Start: 2025-08-25 | End: 2025-08-25 | Stop reason: HOSPADM

## 2025-08-25 RX ORDER — OLANZAPINE 5 MG/1
5 TABLET, FILM COATED ORAL NIGHTLY
COMMUNITY

## 2025-08-25 RX ORDER — HALOPERIDOL LACTATE 5 MG/ML
5 INJECTION, SOLUTION INTRAMUSCULAR EVERY 4 HOURS PRN
Status: DISCONTINUED | OUTPATIENT
Start: 2025-08-25 | End: 2025-08-25 | Stop reason: HOSPADM